# Patient Record
Sex: FEMALE | Race: BLACK OR AFRICAN AMERICAN | NOT HISPANIC OR LATINO | URBAN - METROPOLITAN AREA
[De-identification: names, ages, dates, MRNs, and addresses within clinical notes are randomized per-mention and may not be internally consistent; named-entity substitution may affect disease eponyms.]

---

## 2017-06-29 ENCOUNTER — INPATIENT (INPATIENT)
Facility: HOSPITAL | Age: 62
LOS: 2 days | Discharge: HOME | End: 2017-07-02
Attending: INTERNAL MEDICINE | Admitting: INTERNAL MEDICINE
Payer: COMMERCIAL

## 2017-06-29 VITALS
DIASTOLIC BLOOD PRESSURE: 84 MMHG | HEART RATE: 99 BPM | TEMPERATURE: 99 F | RESPIRATION RATE: 16 BRPM | OXYGEN SATURATION: 97 % | WEIGHT: 231.93 LBS | HEIGHT: 63 IN | SYSTOLIC BLOOD PRESSURE: 164 MMHG

## 2017-06-29 DIAGNOSIS — E11.621 TYPE 2 DIABETES MELLITUS WITH FOOT ULCER: ICD-10-CM

## 2017-06-29 DIAGNOSIS — I10 ESSENTIAL (PRIMARY) HYPERTENSION: ICD-10-CM

## 2017-06-29 DIAGNOSIS — L03.115 CELLULITIS OF RIGHT LOWER LIMB: ICD-10-CM

## 2017-06-29 LAB
ALBUMIN SERPL ELPH-MCNC: 3.1 G/DL — LOW (ref 3.3–5)
ALP SERPL-CCNC: 74 U/L — SIGNIFICANT CHANGE UP (ref 40–120)
ALT FLD-CCNC: 15 U/L — SIGNIFICANT CHANGE UP (ref 12–78)
ANION GAP SERPL CALC-SCNC: 9 MMOL/L — SIGNIFICANT CHANGE UP (ref 5–17)
APPEARANCE UR: CLEAR — SIGNIFICANT CHANGE UP
AST SERPL-CCNC: 11 U/L — LOW (ref 15–37)
BACTERIA # UR AUTO: ABNORMAL
BASOPHILS # BLD AUTO: 0.2 K/UL — SIGNIFICANT CHANGE UP (ref 0–0.2)
BASOPHILS NFR BLD AUTO: 2 % — SIGNIFICANT CHANGE UP (ref 0–2)
BILIRUB SERPL-MCNC: 0.6 MG/DL — SIGNIFICANT CHANGE UP (ref 0.2–1.2)
BILIRUB UR-MCNC: NEGATIVE — SIGNIFICANT CHANGE UP
BUN SERPL-MCNC: 14 MG/DL — SIGNIFICANT CHANGE UP (ref 7–23)
CALCIUM SERPL-MCNC: 8.8 MG/DL — SIGNIFICANT CHANGE UP (ref 8.5–10.1)
CHLORIDE SERPL-SCNC: 104 MMOL/L — SIGNIFICANT CHANGE UP (ref 96–108)
CO2 SERPL-SCNC: 24 MMOL/L — SIGNIFICANT CHANGE UP (ref 22–31)
COLOR SPEC: YELLOW — SIGNIFICANT CHANGE UP
CREAT SERPL-MCNC: 0.78 MG/DL — SIGNIFICANT CHANGE UP (ref 0.5–1.3)
DIFF PNL FLD: NEGATIVE — SIGNIFICANT CHANGE UP
EOSINOPHIL # BLD AUTO: 0 K/UL — SIGNIFICANT CHANGE UP (ref 0–0.5)
EOSINOPHIL NFR BLD AUTO: 0.4 % — SIGNIFICANT CHANGE UP (ref 0–6)
GLUCOSE SERPL-MCNC: 192 MG/DL — HIGH (ref 70–99)
GLUCOSE UR QL: 100 MG/DL
GRAN CASTS # UR COMP ASSIST: SIGNIFICANT CHANGE UP /LPF
HCT VFR BLD CALC: 32.1 % — LOW (ref 34.5–45)
HGB BLD-MCNC: 11 G/DL — LOW (ref 11.5–15.5)
HYALINE CASTS # UR AUTO: ABNORMAL /LPF
KETONES UR-MCNC: NEGATIVE — SIGNIFICANT CHANGE UP
LACTATE SERPL-SCNC: 1.6 MMOL/L — SIGNIFICANT CHANGE UP (ref 0.7–2)
LEUKOCYTE ESTERASE UR-ACNC: NEGATIVE — SIGNIFICANT CHANGE UP
LIDOCAIN IGE QN: 72 U/L — LOW (ref 73–393)
LYMPHOCYTES # BLD AUTO: 2.3 K/UL — SIGNIFICANT CHANGE UP (ref 1–3.3)
LYMPHOCYTES # BLD AUTO: 23.7 % — SIGNIFICANT CHANGE UP (ref 13–44)
MCHC RBC-ENTMCNC: 28.5 PG — SIGNIFICANT CHANGE UP (ref 27–34)
MCHC RBC-ENTMCNC: 34.3 GM/DL — SIGNIFICANT CHANGE UP (ref 32–36)
MCV RBC AUTO: 83 FL — SIGNIFICANT CHANGE UP (ref 80–100)
MONOCYTES # BLD AUTO: 0.6 K/UL — SIGNIFICANT CHANGE UP (ref 0–0.9)
MONOCYTES NFR BLD AUTO: 6.1 % — SIGNIFICANT CHANGE UP (ref 2–14)
NEUTROPHILS # BLD AUTO: 6.7 K/UL — SIGNIFICANT CHANGE UP (ref 1.8–7.4)
NEUTROPHILS NFR BLD AUTO: 67.7 % — SIGNIFICANT CHANGE UP (ref 43–77)
NITRITE UR-MCNC: NEGATIVE — SIGNIFICANT CHANGE UP
PH UR: 6 — SIGNIFICANT CHANGE UP (ref 5–8)
PLATELET # BLD AUTO: 274 K/UL — SIGNIFICANT CHANGE UP (ref 150–400)
POTASSIUM SERPL-MCNC: 4.2 MMOL/L — SIGNIFICANT CHANGE UP (ref 3.5–5.3)
POTASSIUM SERPL-SCNC: 4.2 MMOL/L — SIGNIFICANT CHANGE UP (ref 3.5–5.3)
PROT SERPL-MCNC: 7.2 GM/DL — SIGNIFICANT CHANGE UP (ref 6–8.3)
PROT UR-MCNC: 15 MG/DL
RBC # BLD: 3.86 M/UL — SIGNIFICANT CHANGE UP (ref 3.8–5.2)
RBC # FLD: 12.8 % — SIGNIFICANT CHANGE UP (ref 11–15)
SODIUM SERPL-SCNC: 137 MMOL/L — SIGNIFICANT CHANGE UP (ref 135–145)
SP GR SPEC: 1.01 — SIGNIFICANT CHANGE UP (ref 1.01–1.02)
UROBILINOGEN FLD QL: NEGATIVE MG/DL — SIGNIFICANT CHANGE UP
WBC # BLD: 9.9 K/UL — SIGNIFICANT CHANGE UP (ref 3.8–10.5)
WBC # FLD AUTO: 9.9 K/UL — SIGNIFICANT CHANGE UP (ref 3.8–10.5)

## 2017-06-29 PROCEDURE — 99285 EMERGENCY DEPT VISIT HI MDM: CPT

## 2017-06-29 PROCEDURE — 93971 EXTREMITY STUDY: CPT | Mod: 26,RT

## 2017-06-29 PROCEDURE — 73630 X-RAY EXAM OF FOOT: CPT | Mod: 26,RT

## 2017-06-29 RX ORDER — PANTOPRAZOLE SODIUM 20 MG/1
40 TABLET, DELAYED RELEASE ORAL
Qty: 0 | Refills: 0 | Status: DISCONTINUED | OUTPATIENT
Start: 2017-06-29 | End: 2017-07-02

## 2017-06-29 RX ORDER — CEFTRIAXONE 500 MG/1
1 INJECTION, POWDER, FOR SOLUTION INTRAMUSCULAR; INTRAVENOUS ONCE
Qty: 0 | Refills: 0 | Status: COMPLETED | OUTPATIENT
Start: 2017-06-29 | End: 2017-06-29

## 2017-06-29 RX ORDER — ACETAMINOPHEN 500 MG
650 TABLET ORAL ONCE
Qty: 0 | Refills: 0 | Status: COMPLETED | OUTPATIENT
Start: 2017-06-29 | End: 2017-06-29

## 2017-06-29 RX ORDER — PIPERACILLIN AND TAZOBACTAM 4; .5 G/20ML; G/20ML
3.38 INJECTION, POWDER, LYOPHILIZED, FOR SOLUTION INTRAVENOUS EVERY 8 HOURS
Qty: 0 | Refills: 0 | Status: DISCONTINUED | OUTPATIENT
Start: 2017-06-29 | End: 2017-07-02

## 2017-06-29 RX ORDER — DEXTROSE 50 % IN WATER 50 %
25 SYRINGE (ML) INTRAVENOUS ONCE
Qty: 0 | Refills: 0 | Status: DISCONTINUED | OUTPATIENT
Start: 2017-06-29 | End: 2017-07-02

## 2017-06-29 RX ORDER — PIPERACILLIN AND TAZOBACTAM 4; .5 G/20ML; G/20ML
3.38 INJECTION, POWDER, LYOPHILIZED, FOR SOLUTION INTRAVENOUS ONCE
Qty: 0 | Refills: 0 | Status: COMPLETED | OUTPATIENT
Start: 2017-06-29 | End: 2017-06-29

## 2017-06-29 RX ORDER — DEXTROSE 50 % IN WATER 50 %
12.5 SYRINGE (ML) INTRAVENOUS ONCE
Qty: 0 | Refills: 0 | Status: DISCONTINUED | OUTPATIENT
Start: 2017-06-29 | End: 2017-07-02

## 2017-06-29 RX ORDER — SODIUM CHLORIDE 9 MG/ML
1000 INJECTION, SOLUTION INTRAVENOUS
Qty: 0 | Refills: 0 | Status: DISCONTINUED | OUTPATIENT
Start: 2017-06-29 | End: 2017-07-02

## 2017-06-29 RX ORDER — HEPARIN SODIUM 5000 [USP'U]/ML
5000 INJECTION INTRAVENOUS; SUBCUTANEOUS EVERY 12 HOURS
Qty: 0 | Refills: 0 | Status: DISCONTINUED | OUTPATIENT
Start: 2017-06-29 | End: 2017-07-02

## 2017-06-29 RX ORDER — DEXTROSE 50 % IN WATER 50 %
1 SYRINGE (ML) INTRAVENOUS ONCE
Qty: 0 | Refills: 0 | Status: DISCONTINUED | OUTPATIENT
Start: 2017-06-29 | End: 2017-07-02

## 2017-06-29 RX ORDER — SODIUM CHLORIDE 9 MG/ML
1000 INJECTION, SOLUTION INTRAVENOUS
Qty: 0 | Refills: 0 | Status: DISCONTINUED | OUTPATIENT
Start: 2017-06-29 | End: 2017-06-30

## 2017-06-29 RX ORDER — GLUCAGON INJECTION, SOLUTION 0.5 MG/.1ML
1 INJECTION, SOLUTION SUBCUTANEOUS ONCE
Qty: 0 | Refills: 0 | Status: DISCONTINUED | OUTPATIENT
Start: 2017-06-29 | End: 2017-07-02

## 2017-06-29 RX ORDER — INSULIN LISPRO 100/ML
VIAL (ML) SUBCUTANEOUS
Qty: 0 | Refills: 0 | Status: DISCONTINUED | OUTPATIENT
Start: 2017-06-29 | End: 2017-07-02

## 2017-06-29 RX ORDER — DOCUSATE SODIUM 100 MG
100 CAPSULE ORAL THREE TIMES A DAY
Qty: 0 | Refills: 0 | Status: DISCONTINUED | OUTPATIENT
Start: 2017-06-29 | End: 2017-07-02

## 2017-06-29 RX ADMIN — Medication 100 MILLIGRAM(S): at 22:02

## 2017-06-29 RX ADMIN — Medication 650 MILLIGRAM(S): at 14:19

## 2017-06-29 RX ADMIN — CEFTRIAXONE 100 GRAM(S): 500 INJECTION, POWDER, FOR SOLUTION INTRAMUSCULAR; INTRAVENOUS at 17:58

## 2017-06-29 RX ADMIN — PIPERACILLIN AND TAZOBACTAM 200 GRAM(S): 4; .5 INJECTION, POWDER, LYOPHILIZED, FOR SOLUTION INTRAVENOUS at 19:55

## 2017-06-29 RX ADMIN — PIPERACILLIN AND TAZOBACTAM 25 GRAM(S): 4; .5 INJECTION, POWDER, LYOPHILIZED, FOR SOLUTION INTRAVENOUS at 22:10

## 2017-06-29 RX ADMIN — Medication 650 MILLIGRAM(S): at 14:59

## 2017-06-29 RX ADMIN — HEPARIN SODIUM 5000 UNIT(S): 5000 INJECTION INTRAVENOUS; SUBCUTANEOUS at 19:55

## 2017-06-29 RX ADMIN — SODIUM CHLORIDE 70 MILLILITER(S): 9 INJECTION, SOLUTION INTRAVENOUS at 21:59

## 2017-06-29 NOTE — H&P ADULT - NSHPLABSRESULTS_GEN_ALL_CORE
LABS:                        11.0   9.9   )-----------( 274      ( 29 Jun 2017 16:45 )             32.1     06-29    137  |  104  |  14  ----------------------------<  192<H>  4.2   |  24  |  0.78    Ca    8.8      29 Jun 2017 16:45    TPro  7.2  /  Alb  3.1<L>  /  TBili  0.6  /  DBili  x   /  AST  11<L>  /  ALT  15  /  AlkPhos  74  06-29      < from: Xray Foot AP + Lateral + Oblique, Right (06.29.17 @ 15:29) >      EXAM:  FOOT COMPLETE  3 VWS  RT                            PROCEDURE DATE:  06/29/2017        INTERPRETATION:  CLINICAL INFORMATION: Right lower extremity ulcer. Pain   and swelling for one day.    EXAM: Frontal, lateral, and oblique views of the right foot on 6/29/2017.    FINDINGS:    No radiographic evidence of large soft tissue ulceration. Mild soft   tissue swelling about the right foot. No aggressive periosteal reaction   or cortical destruction. No subcutaneous emphysema. No acute fracture or   dislocation is identified. The lisfranc alignment is intact.     IMPRESSION:    Mild soft tissue swelling about the right foot. No evidence of   subcutaneous gas, cortical destruction, or aggressive periosteal reaction.          < from: US Duplex Venous Lower Ext Ltd, Right (06.29.17 @ 15:54) >      EXAM:  US DPLX LWR EXT VEINS LTD RT                            PROCEDURE DATE:  06/29/2017        INTERPRETATION:  CLINICAL INFORMATION: Right lower extremity pain and   swelling. Rule out DVT.    COMPARISON: Right lower extremity duplex of 9/19/2016.    TECHNIQUE: Duplex sonography of the right lower extremity with color and   spectral Doppler, with and without compression.      FINDINGS:    There is normal compressibility of the right common femoral, femoral and   popliteal veins. No calf vein thrombosis is detected.    Doppler examination shows normal spontaneous and phasic flow.    IMPRESSION:     No evidence of right lower extremity deep venous thrombosis.      < end of copied text >

## 2017-06-29 NOTE — H&P ADULT - HISTORY OF PRESENT ILLNESS
61yo, BF with history of DM2, PVD, phlebitis, HTN, ANIA who presents with a chief complaint of RLE redness and swelling, started yesterday but has had a history of recurrent RLE cellulitis for 17yrs. Pt was seen by Dr. Ramirez today and was instructed to go to the hospital for admission and IV abx.   Patient notes increasing pain, swelling, associated with ulcer R foot.  Denies CP, SOB, cough, palpitation, n/v/d, fever, chills, weakness, abdominal pain ,dysuria.

## 2017-06-29 NOTE — ED ADULT NURSE NOTE - PMH
Cellulitis of leg without foot, right    Diabetes    Diabetes    Hypertension    Hypertension    ANIA (obstructive sleep apnea)    Phlebitis

## 2017-06-29 NOTE — CONSULT NOTE ADULT - ASSESSMENT
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 Guerin

## 2017-06-29 NOTE — CONSULT NOTE ADULT - SUBJECTIVE AND OBJECTIVE BOX
Patient is a 62y old  Female with history of DM, phlebitis, HTN, ANIA who presents with a chief complaint of RLE redness swelling and pain. Pt reports the symptoms started yesterday but has had a history of recurrent RLE cellulitis from time to time. Pt was seen by Dr. Ramirez today and was instructed to go to the hospital for admission and IV abx. Pt reports sometimes her groin swells and becomes painful as well. Pt denies f/c/n/v/SOB/CP. Admits to traveling to [place] on a plane a month ago.      PAST MEDICAL & SURGICAL HISTORY:  ANIA (obstructive sleep apnea)  Cellulitis of leg without foot, right  Phlebitis  Hypertension  Diabetes  Hypertension  Diabetes  No significant past surgical history      MEDICATIONS  (STANDING):    MEDICATIONS  (PRN):      Allergies    No Known Allergies    Intolerances        VITALS:    Vital Signs Last 24 Hrs  T(C): 37.3 (29 Jun 2017 12:52), Max: 37.3 (29 Jun 2017 12:52)  T(F): 99.1 (29 Jun 2017 12:52), Max: 99.1 (29 Jun 2017 12:52)  HR: 99 (29 Jun 2017 12:52) (99 - 99)  BP: 164/84 (29 Jun 2017 12:52) (164/84 - 164/84)  BP(mean): --  RR: 16 (29 Jun 2017 12:52) (16 - 16)  SpO2: 97% (29 Jun 2017 12:52) (97% - 97%)    LABS:                CAPILLARY BLOOD GLUCOSE              LOWER EXTREMITY PHYSICAL EXAM:    Vasular: DP/PT 2/4, B/L, CFT <2 seconds B/L, Temperature gradient WNL, L, increased on R.   Neuro: Epicritic sensation intact  Wound #1:   Location: right 4th interspace  Size: 1cm x 1cm x 0cm  Wound bed: Maceration  Drainage: None  Odor: None  Periwound: Macerated  Etiology: Moisture    RADIOLOGY & ADDITIONAL STUDIES:

## 2017-06-29 NOTE — H&P ADULT - PROBLEM SELECTOR PROBLEM 2
Diabetic ulcer of toe of right foot associated with type 2 diabetes mellitus, unspecified ulcer stage

## 2017-06-29 NOTE — ED PROVIDER NOTE - SKIN COLOR
RLE has 6x 5 cm erythematous, warm and tender region on anterior lower shin. No calf pain, but has some swelling.

## 2017-06-29 NOTE — ED PROVIDER NOTE - OBJECTIVE STATEMENT
Pt is a 63 yo lady with a pmhx of NIDM2 who presents to the ED with R. leg pain and redness. Has had pain since last night, has had prior cellulitis mult times in past. Has an ulcer between 4th toe. No fevers. No chest pain, no sob, no hx of dvt/pe. No dysuria, no n/v/d. No trauma. Podiatrist sent her in for IV abx and admission.

## 2017-06-29 NOTE — H&P ADULT - NSHPREVIEWOFSYSTEMS_GEN_ALL_CORE
CONSTITUTIONAL: No fever, weight loss, or fatigue  EYES: No eye pain, visual disturbances, or discharge  ENMT:  No difficulty hearing, tinnitus, vertigo; No sinus or throat pain  NECK: No pain or stiffness  BREASTS: No pain, masses, or nipple discharge  RESPIRATORY: No cough, wheezing, chills or hemoptysis; No shortness of breath  CARDIOVASCULAR: No chest pain, palpitations, dizziness, or leg swelling  GASTROINTESTINAL: No abdominal or epigastric pain. No nausea, vomiting, or hematemesis; No diarrhea or constipation. No melena or hematochezia.  GENITOURINARY: No dysuria, frequency, hematuria, or incontinence  NEUROLOGICAL: No headaches, memory loss, loss of strength, numbness, or tremors  SKIN: +++ redness, R leg, ulcer R 4th digit.  LYMPH NODES: No enlarged glands  ENDOCRINE: No heat or cold intolerance; No hair loss  MUSCULOSKELETAL: Positive swelling and  pain R leg and foot.  PSYCHIATRIC: No depression, anxiety, mood swings, or difficulty sleeping  HEME/LYMPH: No easy bruising, or bleeding gums  ALLERGY AND IMMUNOLOGIC: No hives or eczema

## 2017-06-29 NOTE — H&P ADULT - NSHPPHYSICALEXAM_GEN_ALL_CORE
PHYSICAL EXAM:  GENERAL: NAD, well-groomed, well-developed  HEAD:  Atraumatic, Normocephalic  EYES: EOMI, PERRLA, conjunctiva and sclera clear  ENMT: No tonsillar erythema, exudates, or enlargement; Moist mucous membranes, No lesions  NECK: Supple, No JVD, Normal thyroid  NERVOUS SYSTEM:  Alert & Oriented X3; Motor Strength 5/5 B/L upper and lower extremities; DTRs 2+ intact and symmetric  CHEST/LUNG: Clear bilaterally; No rales, rhonchi, wheezing, or rubs  HEART: Regular rate and rhythm; No murmurs, rubs, or gallops  ABDOMEN: Soft, obese, Nontender, Nondistended; Bowel sounds present  EXTREMITIES:  1+ pedal/ RLE edema with tenderness  LYMPH: No lymphadenopathy noted  SKIN: Erythema anterior RLE, ulceration 4th digit.

## 2017-06-30 DIAGNOSIS — L03.115 CELLULITIS OF RIGHT LOWER LIMB: ICD-10-CM

## 2017-06-30 LAB
ALBUMIN SERPL ELPH-MCNC: 2.9 G/DL — LOW (ref 3.3–5)
ALP SERPL-CCNC: 68 U/L — SIGNIFICANT CHANGE UP (ref 40–120)
ALT FLD-CCNC: 15 U/L — SIGNIFICANT CHANGE UP (ref 12–78)
ANION GAP SERPL CALC-SCNC: 9 MMOL/L — SIGNIFICANT CHANGE UP (ref 5–17)
AST SERPL-CCNC: 11 U/L — LOW (ref 15–37)
BASOPHILS # BLD AUTO: 0.1 K/UL — SIGNIFICANT CHANGE UP (ref 0–0.2)
BASOPHILS NFR BLD AUTO: 1.5 % — SIGNIFICANT CHANGE UP (ref 0–2)
BILIRUB SERPL-MCNC: 0.7 MG/DL — SIGNIFICANT CHANGE UP (ref 0.2–1.2)
BUN SERPL-MCNC: 15 MG/DL — SIGNIFICANT CHANGE UP (ref 7–23)
CALCIUM SERPL-MCNC: 8.7 MG/DL — SIGNIFICANT CHANGE UP (ref 8.5–10.1)
CHLORIDE SERPL-SCNC: 104 MMOL/L — SIGNIFICANT CHANGE UP (ref 96–108)
CO2 SERPL-SCNC: 26 MMOL/L — SIGNIFICANT CHANGE UP (ref 22–31)
CREAT SERPL-MCNC: 0.84 MG/DL — SIGNIFICANT CHANGE UP (ref 0.5–1.3)
EOSINOPHIL # BLD AUTO: 0.1 K/UL — SIGNIFICANT CHANGE UP (ref 0–0.5)
EOSINOPHIL NFR BLD AUTO: 1.2 % — SIGNIFICANT CHANGE UP (ref 0–6)
GLUCOSE SERPL-MCNC: 187 MG/DL — HIGH (ref 70–99)
HBA1C BLD-MCNC: 9.5 % — HIGH (ref 4–5.6)
HCT VFR BLD CALC: 32.1 % — LOW (ref 34.5–45)
HGB BLD-MCNC: 11 G/DL — LOW (ref 11.5–15.5)
LYMPHOCYTES # BLD AUTO: 2.6 K/UL — SIGNIFICANT CHANGE UP (ref 1–3.3)
LYMPHOCYTES # BLD AUTO: 30.4 % — SIGNIFICANT CHANGE UP (ref 13–44)
MCHC RBC-ENTMCNC: 28.5 PG — SIGNIFICANT CHANGE UP (ref 27–34)
MCHC RBC-ENTMCNC: 34.2 GM/DL — SIGNIFICANT CHANGE UP (ref 32–36)
MCV RBC AUTO: 83.2 FL — SIGNIFICANT CHANGE UP (ref 80–100)
MONOCYTES # BLD AUTO: 0.6 K/UL — SIGNIFICANT CHANGE UP (ref 0–0.9)
MONOCYTES NFR BLD AUTO: 7.3 % — SIGNIFICANT CHANGE UP (ref 2–14)
NEUTROPHILS # BLD AUTO: 5.2 K/UL — SIGNIFICANT CHANGE UP (ref 1.8–7.4)
NEUTROPHILS NFR BLD AUTO: 59.5 % — SIGNIFICANT CHANGE UP (ref 43–77)
PLATELET # BLD AUTO: 246 K/UL — SIGNIFICANT CHANGE UP (ref 150–400)
POTASSIUM SERPL-MCNC: 4.2 MMOL/L — SIGNIFICANT CHANGE UP (ref 3.5–5.3)
POTASSIUM SERPL-SCNC: 4.2 MMOL/L — SIGNIFICANT CHANGE UP (ref 3.5–5.3)
PROT SERPL-MCNC: 6.9 GM/DL — SIGNIFICANT CHANGE UP (ref 6–8.3)
RBC # BLD: 3.86 M/UL — SIGNIFICANT CHANGE UP (ref 3.8–5.2)
RBC # FLD: 12.5 % — SIGNIFICANT CHANGE UP (ref 11–15)
SODIUM SERPL-SCNC: 139 MMOL/L — SIGNIFICANT CHANGE UP (ref 135–145)
WBC # BLD: 8.7 K/UL — SIGNIFICANT CHANGE UP (ref 3.8–10.5)
WBC # FLD AUTO: 8.7 K/UL — SIGNIFICANT CHANGE UP (ref 3.8–10.5)

## 2017-06-30 RX ORDER — FUROSEMIDE 40 MG
40 TABLET ORAL ONCE
Qty: 0 | Refills: 0 | Status: COMPLETED | OUTPATIENT
Start: 2017-06-30 | End: 2017-06-30

## 2017-06-30 RX ADMIN — Medication 40 MILLIGRAM(S): at 13:24

## 2017-06-30 RX ADMIN — HEPARIN SODIUM 5000 UNIT(S): 5000 INJECTION INTRAVENOUS; SUBCUTANEOUS at 18:47

## 2017-06-30 RX ADMIN — Medication 1 TABLET(S): at 11:56

## 2017-06-30 RX ADMIN — HEPARIN SODIUM 5000 UNIT(S): 5000 INJECTION INTRAVENOUS; SUBCUTANEOUS at 06:09

## 2017-06-30 RX ADMIN — Medication 2: at 16:23

## 2017-06-30 RX ADMIN — PIPERACILLIN AND TAZOBACTAM 25 GRAM(S): 4; .5 INJECTION, POWDER, LYOPHILIZED, FOR SOLUTION INTRAVENOUS at 14:41

## 2017-06-30 RX ADMIN — PIPERACILLIN AND TAZOBACTAM 25 GRAM(S): 4; .5 INJECTION, POWDER, LYOPHILIZED, FOR SOLUTION INTRAVENOUS at 23:02

## 2017-06-30 RX ADMIN — Medication 6: at 11:56

## 2017-06-30 RX ADMIN — PIPERACILLIN AND TAZOBACTAM 25 GRAM(S): 4; .5 INJECTION, POWDER, LYOPHILIZED, FOR SOLUTION INTRAVENOUS at 06:08

## 2017-06-30 RX ADMIN — Medication 100 MILLIGRAM(S): at 13:25

## 2017-06-30 RX ADMIN — Medication 2: at 08:10

## 2017-06-30 RX ADMIN — PANTOPRAZOLE SODIUM 40 MILLIGRAM(S): 20 TABLET, DELAYED RELEASE ORAL at 08:10

## 2017-06-30 NOTE — PROGRESS NOTE ADULT - PROBLEM SELECTOR PLAN 2
As above  Podiatry consult noted: cellulitis of leg  no podiatric emergent finding  c/w betadine to 4th interspace for maceration of right foot  reconsult podiatry as needed  c/w IV abx for leg cellulitis.   Local wound care  Insulin sliding scale and FS

## 2017-06-30 NOTE — DIETITIAN INITIAL EVALUATION ADULT. - PERTINENT LABORATORY DATA
06-30 Na 139 mmol/L Glu 187 mg/dL<H> K+ 4.2 mmol/L Cr  0.84 mg/dL BUN 15 mg/dL Phos n/a   Alb 2.9 g/dL<L> PAB n/a   Hgb 11.0 g/dL<L> Hct 32.1 %<L>, Lipase=72, VdnM6G=4.5%(6/30)

## 2017-06-30 NOTE — DIETITIAN INITIAL EVALUATION ADULT. - OTHER INFO
Pt seen for RN consult 6/30 for BMI?40 & A1C=9.5%. Pt lives with mother & grand daughter & pt does cooking & food shopping. Pt manages DM type 2 with Metformin 500mg 2 x day & Glimepiride 4mg daily & checks Blood glucose 1-2 x day. Pt reports last BM x 1 (6/29). Po intake currently 100% meals today.

## 2017-06-30 NOTE — DIETITIAN INITIAL EVALUATION ADULT. - NUTRITION INTERVENTION
Nutrition Education Meals and Snack/Collaboration and Referral of Nutrition Care/Nutrition Education

## 2017-07-01 LAB
ANION GAP SERPL CALC-SCNC: 10 MMOL/L — SIGNIFICANT CHANGE UP (ref 5–17)
BUN SERPL-MCNC: 18 MG/DL — SIGNIFICANT CHANGE UP (ref 7–23)
CALCIUM SERPL-MCNC: 9 MG/DL — SIGNIFICANT CHANGE UP (ref 8.5–10.1)
CHLORIDE SERPL-SCNC: 100 MMOL/L — SIGNIFICANT CHANGE UP (ref 96–108)
CO2 SERPL-SCNC: 28 MMOL/L — SIGNIFICANT CHANGE UP (ref 22–31)
CREAT SERPL-MCNC: 0.99 MG/DL — SIGNIFICANT CHANGE UP (ref 0.5–1.3)
CULTURE RESULTS: NO GROWTH — SIGNIFICANT CHANGE UP
GLUCOSE SERPL-MCNC: 206 MG/DL — HIGH (ref 70–99)
HCT VFR BLD CALC: 36.4 % — SIGNIFICANT CHANGE UP (ref 34.5–45)
HGB BLD-MCNC: 12.2 G/DL — SIGNIFICANT CHANGE UP (ref 11.5–15.5)
MCHC RBC-ENTMCNC: 27.8 PG — SIGNIFICANT CHANGE UP (ref 27–34)
MCHC RBC-ENTMCNC: 33.6 GM/DL — SIGNIFICANT CHANGE UP (ref 32–36)
MCV RBC AUTO: 82.8 FL — SIGNIFICANT CHANGE UP (ref 80–100)
PLATELET # BLD AUTO: 276 K/UL — SIGNIFICANT CHANGE UP (ref 150–400)
POTASSIUM SERPL-MCNC: 4 MMOL/L — SIGNIFICANT CHANGE UP (ref 3.5–5.3)
POTASSIUM SERPL-SCNC: 4 MMOL/L — SIGNIFICANT CHANGE UP (ref 3.5–5.3)
RBC # BLD: 4.4 M/UL — SIGNIFICANT CHANGE UP (ref 3.8–5.2)
RBC # FLD: 12.5 % — SIGNIFICANT CHANGE UP (ref 11–15)
SODIUM SERPL-SCNC: 138 MMOL/L — SIGNIFICANT CHANGE UP (ref 135–145)
SPECIMEN SOURCE: SIGNIFICANT CHANGE UP
WBC # BLD: 7.3 K/UL — SIGNIFICANT CHANGE UP (ref 3.8–10.5)
WBC # FLD AUTO: 7.3 K/UL — SIGNIFICANT CHANGE UP (ref 3.8–10.5)

## 2017-07-01 RX ADMIN — PIPERACILLIN AND TAZOBACTAM 25 GRAM(S): 4; .5 INJECTION, POWDER, LYOPHILIZED, FOR SOLUTION INTRAVENOUS at 05:09

## 2017-07-01 RX ADMIN — PIPERACILLIN AND TAZOBACTAM 25 GRAM(S): 4; .5 INJECTION, POWDER, LYOPHILIZED, FOR SOLUTION INTRAVENOUS at 22:21

## 2017-07-01 RX ADMIN — Medication 2: at 08:28

## 2017-07-01 RX ADMIN — Medication 2: at 15:55

## 2017-07-01 RX ADMIN — PIPERACILLIN AND TAZOBACTAM 25 GRAM(S): 4; .5 INJECTION, POWDER, LYOPHILIZED, FOR SOLUTION INTRAVENOUS at 13:43

## 2017-07-01 RX ADMIN — Medication 1 TABLET(S): at 11:39

## 2017-07-01 RX ADMIN — Medication 10: at 11:39

## 2017-07-01 RX ADMIN — Medication 100 MILLIGRAM(S): at 13:46

## 2017-07-01 RX ADMIN — PANTOPRAZOLE SODIUM 40 MILLIGRAM(S): 20 TABLET, DELAYED RELEASE ORAL at 08:28

## 2017-07-01 RX ADMIN — HEPARIN SODIUM 5000 UNIT(S): 5000 INJECTION INTRAVENOUS; SUBCUTANEOUS at 05:09

## 2017-07-01 RX ADMIN — HEPARIN SODIUM 5000 UNIT(S): 5000 INJECTION INTRAVENOUS; SUBCUTANEOUS at 18:05

## 2017-07-01 NOTE — PROGRESS NOTE ADULT - PROBLEM SELECTOR PLAN 1
IV Zosyn  IVF
IV Zosyn  IVF
no foot wound.  cellulitis of leg  no podiatric emergent finding  c/w betadine to 4th interspace for maceration of right foot  reconsult podiatry as needed  c/w IV abx for leg cellulitis

## 2017-07-01 NOTE — PROGRESS NOTE ADULT - ASSESSMENT
Admitted for Cellulitis RLE with ulceration 4th toe, DM2 and HTN.
Admitted for Cellulitis RLE with ulceration 4th toe, DM2 and HTN.
62F wit with RLE cellulitis

## 2017-07-01 NOTE — PROGRESS NOTE ADULT - PROBLEM SELECTOR PROBLEM 1
Cellulitis of right lower extremity
Cellulitis of right lower extremity
Cellulitis of leg without foot, right

## 2017-07-01 NOTE — PROGRESS NOTE ADULT - SUBJECTIVE AND OBJECTIVE BOX
Patient is a 62y old  Female who presents with a chief complaint of foot pain and swelling (2017 22:30)      INTERVAL HPI/OVERNIGHT EVENTS: NAD, AAOX3. no pain in foot    MEDICATIONS  (STANDING):  heparin  Injectable 5000 Unit(s) SubCutaneous every 12 hours  sodium chloride 0.45%. 1000 milliLiter(s) (70 mL/Hr) IV Continuous <Continuous>  docusate sodium 100 milliGRAM(s) Oral three times a day  multivitamin 1 Tablet(s) Oral daily  insulin lispro (HumaLOG) corrective regimen sliding scale   SubCutaneous three times a day before meals  dextrose 5%. 1000 milliLiter(s) (50 mL/Hr) IV Continuous <Continuous>  dextrose 50% Injectable 12.5 Gram(s) IV Push once  dextrose 50% Injectable 25 Gram(s) IV Push once  dextrose 50% Injectable 25 Gram(s) IV Push once  piperacillin/tazobactam IVPB. 3.375 Gram(s) IV Intermittent every 8 hours  pantoprazole    Tablet 40 milliGRAM(s) Oral before breakfast    MEDICATIONS  (PRN):  dextrose Gel 1 Dose(s) Oral once PRN Blood Glucose LESS THAN 70 milliGRAM(s)/deciliter  glucagon  Injectable 1 milliGRAM(s) IntraMuscular once PRN Glucose LESS THAN 70 milligrams/deciliter  oxyCODONE  5 mG/acetaminophen 325 mG 2 Tablet(s) Oral every 4 hours PRN Moderate Pain (4 - 6)      Allergies    No Known Allergies    Intolerances        REVIEW OF SYSTEMS:  CONSTITUTIONAL: No fever, weight loss, or fatigue  EYES: No eye pain, visual disturbances, or discharge  ENMT:  No difficulty hearing, tinnitus, vertigo; No sinus or throat pain  NECK: No pain or stiffness  BREASTS: No pain, masses, or nipple discharge  RESPIRATORY: No cough, wheezing, chills or hemoptysis; No shortness of breath  CARDIOVASCULAR: No chest pain, palpitations, dizziness, or leg swelling  GASTROINTESTINAL: No abdominal or epigastric pain. No nausea, vomiting, or hematemesis; No diarrhea or constipation. No melena or hematochezia.  GENITOURINARY: No dysuria, frequency, hematuria, or incontinence  NEUROLOGICAL: No headaches, memory loss, loss of strength, numbness, or tremors  SKIN: No itching, burning, rashes, or lesions   LYMPH NODES: No enlarged glands  ENDOCRINE: No heat or cold intolerance; No hair loss  MUSCULOSKELETAL: No joint pain or swelling; No muscle, back, or extremity pain  PSYCHIATRIC: No depression, anxiety, mood swings, or difficulty sleeping  HEME/LYMPH: No easy bruising, or bleeding gums  ALLERY AND IMMUNOLOGIC: No hives or eczema    Vital Signs Last 24 Hrs  T(C): 36.8 (2017 05:32), Max: 37.3 (2017 12:52)  T(F): 98.3 (2017 05:32), Max: 99.1 (2017 12:52)  HR: 66 (2017 05:32) (62 - 99)  BP: 143/75 (2017 05:32) (143/75 - 174/81)  BP(mean): --  RR: 16 (2017 05:32) (16 - 17)  SpO2: 96% (2017 05:32) (96% - 98%)    Vasular: DP/PT 2/4, B/L, CFT <2 seconds B/L, Temperature gradient WNL, L, increased on R.   Neuro: Epicritic sensation intact  Wound #1:   Location: right 4th interspace  Size: 1cm x 1cm x 0cm  Wound bed: Maceration  Drainage: None  Odor: None  Periwound: Macerated    I&O's Detail    2017 07:01  -  2017 07:00  --------------------------------------------------------  IN:    IV PiggyBack: 200 mL    sodium chloride 0.45%.: 630 mL  Total IN: 830 mL    OUT:  Total OUT: 0 mL    Total NET: 830 mL          LABS:                        11.0   8.7   )-----------( 246      ( 2017 06:49 )             32.1         139  |  104  |  15  ----------------------------<  187<H>  4.2   |  26  |  0.84    Ca    8.7      2017 06:49    TPro  6.9  /  Alb  2.9<L>  /  TBili  0.7  /  DBili  x   /  AST  11<L>  /  ALT  15  /  AlkPhos  68  06-30      Urinalysis Basic - ( 2017 18:37 )    Color: Yellow / Appearance: Clear / S.010 / pH: x  Gluc: x / Ketone: Negative  / Bili: Negative / Urobili: Negative mg/dL   Blood: x / Protein: 15 mg/dL / Nitrite: Negative   Leuk Esterase: Negative / RBC: x / WBC x   Sq Epi: x / Non Sq Epi: x / Bacteria: Few      CAPILLARY BLOOD GLUCOSE  223 (2017 22:28)  161 (2017 18:01)          RADIOLOGY & ADDITIONAL TESTS:    Imaging Personally Reviewed:  [ ] YES  [ ] NO    Consultant(s) Notes Reviewed:  [ ] YES  [ ] NO    Care Discussed with Consultants/Other Providers [ ] YES  [ ] NO    Essential hypertension  Diabetic ulcer of toe of right foot associated with type 2 diabetes mellitus, unspecified ulcer stage  Cellulitis of right lower extremity
Patient is a 62y old  Female who presents with a chief complaint of foot pain and swelling (2017 22:30)      SUBJECTIVE/OBJECTIVE:    Without complaints.  OOB in chair.      MEDICATIONS  (STANDING):  heparin  Injectable 5000 Unit(s) SubCutaneous every 12 hours  docusate sodium 100 milliGRAM(s) Oral three times a day  multivitamin 1 Tablet(s) Oral daily  insulin lispro (HumaLOG) corrective regimen sliding scale   SubCutaneous three times a day before meals  dextrose 5%. 1000 milliLiter(s) (50 mL/Hr) IV Continuous <Continuous>  dextrose 50% Injectable 12.5 Gram(s) IV Push once  dextrose 50% Injectable 25 Gram(s) IV Push once  dextrose 50% Injectable 25 Gram(s) IV Push once  piperacillin/tazobactam IVPB. 3.375 Gram(s) IV Intermittent every 8 hours  pantoprazole    Tablet 40 milliGRAM(s) Oral before breakfast    MEDICATIONS  (PRN):  dextrose Gel 1 Dose(s) Oral once PRN Blood Glucose LESS THAN 70 milliGRAM(s)/deciliter  glucagon  Injectable 1 milliGRAM(s) IntraMuscular once PRN Glucose LESS THAN 70 milligrams/deciliter  oxyCODONE  5 mG/acetaminophen 325 mG 2 Tablet(s) Oral every 4 hours PRN Moderate Pain (4 - 6)      Allergies    No Known Allergies    Intolerances          Vital Signs Last 24 Hrs  T(C): 36.4 (2017 05:13), Max: 37.1 (2017 17:09)  T(F): 97.6 (2017 05:13), Max: 98.8 (2017 17:09)  HR: 73 (2017 05:13) (73 - 79)  BP: 116/55 (2017 05:13) (116/55 - 139/78)  BP(mean): --  RR: 19 (2017 05:13) (16 - 19)  SpO2: 94% (2017 05:13) (94% - 98%)    Physical Exam:  GENERAL: NAD, well-groomed, well-developed HEAD:  Atraumatic, Normocephalic EYES: EOMI, PERRLA, conjunctiva and sclera clear ENMT: No tonsillar erythema, exudates, or enlargement; Moist mucous membranes, No lesions NECK: Supple, No JVD, Normal thyroid NERVOUS SYSTEM:  Alert & Oriented X3; Motor Strength 5/5 B/L upper and lower extremities; DTRs 2+ intact and symmetric CHEST/LUNG: Clear bilaterally; No rales, rhonchi, wheezing, or rubs HEART: Regular rate and rhythm; No murmurs, rubs, or gallops ABDOMEN: Soft, obese, Nontender, Nondistended; Bowel sounds present EXTREMITIES:  Trace  pedal/ RLE edema with decreased  tenderness LYMPH: No lymphadenopathy noted SKIN: Decreased Erythema anterior RLE, ulceration 4th digit.	    Vasular: DP/PT 2/4, B/L, CFT <2 seconds B/L, Temperature gradient WNL, L, increased on R.   Neuro: Epicritic sensation intact  Wound #1:   Location: right 4th interspace  Size: 1cm x 1cm x 0cm  Wound bed: Maceration  Drainage: None  Odor: None  Periwound: Macerated                LABS:                        12.2   7.3   )-----------( 276      ( 2017 07:39 )             36.4     07-    138  |  100  |  18  ----------------------------<  206<H>  4.0   |  28  |  0.99    Ca    9.0      2017 07:39    TPro  6.9  /  Alb  2.9<L>  /  TBili  0.7  /  DBili  x   /  AST  11<L>  /  ALT  15  /  AlkPhos  68  06-30      Urinalysis Basic - ( 2017 18:37 )    Color: Yellow / Appearance: Clear / S.010 / pH: x  Gluc: x / Ketone: Negative  / Bili: Negative / Urobili: Negative mg/dL   Blood: x / Protein: 15 mg/dL / Nitrite: Negative   Leuk Esterase: Negative / RBC: x / WBC x   Sq Epi: x / Non Sq Epi: x / Bacteria: Few      CAPILLARY BLOOD GLUCOSE  389 (2017 11:38)  197 (2017 08:27)  182 (2017 16:22)              RADIOLOGY & ADDITIONAL TESTS:        Consultant(s) Notes Reviewed:  [ ] YES  [ ] NO
Patient is a 62y old  Female who presents with a chief complaint of foot pain and swelling (2017 22:30)      SUBJECTIVE/OBJECTIVE:  Without complaints, notes less R leg pain.      MEDICATIONS  (STANDING):  heparin  Injectable 5000 Unit(s) SubCutaneous every 12 hours  sodium chloride 0.45%. 1000 milliLiter(s) (70 mL/Hr) IV Continuous <Continuous>  docusate sodium 100 milliGRAM(s) Oral three times a day  multivitamin 1 Tablet(s) Oral daily  insulin lispro (HumaLOG) corrective regimen sliding scale   SubCutaneous three times a day before meals  dextrose 5%. 1000 milliLiter(s) (50 mL/Hr) IV Continuous <Continuous>  dextrose 50% Injectable 12.5 Gram(s) IV Push once  dextrose 50% Injectable 25 Gram(s) IV Push once  dextrose 50% Injectable 25 Gram(s) IV Push once  piperacillin/tazobactam IVPB. 3.375 Gram(s) IV Intermittent every 8 hours  pantoprazole    Tablet 40 milliGRAM(s) Oral before breakfast    MEDICATIONS  (PRN):  dextrose Gel 1 Dose(s) Oral once PRN Blood Glucose LESS THAN 70 milliGRAM(s)/deciliter  glucagon  Injectable 1 milliGRAM(s) IntraMuscular once PRN Glucose LESS THAN 70 milligrams/deciliter  oxyCODONE  5 mG/acetaminophen 325 mG 2 Tablet(s) Oral every 4 hours PRN Moderate Pain (4 - 6)      Allergies    No Known Allergies    Intolerances          Vital Signs Last 24 Hrs  T(C): 36.8 (2017 05:32), Max: 37.3 (2017 12:52)  T(F): 98.3 (2017 05:32), Max: 99.1 (2017 12:52)  HR: 66 (2017 05:32) (62 - 99)  BP: 143/75 (2017 05:32) (143/75 - 174/81)  BP(mean): --  RR: 16 (2017 05:32) (16 - 17)  SpO2: 96% (2017 05:32) (96% - 98%)    Patient is a 62y old  Female who presents with a chief complaint of foot pain and swelling (2017 22:30)      SUBJECTIVE/OBJECTIVE:    MEDICATIONS  (STANDING):  heparin  Injectable 5000 Unit(s) SubCutaneous every 12 hours  sodium chloride 0.45%. 1000 milliLiter(s) (70 mL/Hr) IV Continuous <Continuous>  docusate sodium 100 milliGRAM(s) Oral three times a day  multivitamin 1 Tablet(s) Oral daily  insulin lispro (HumaLOG) corrective regimen sliding scale   SubCutaneous three times a day before meals  dextrose 5%. 1000 milliLiter(s) (50 mL/Hr) IV Continuous <Continuous>  dextrose 50% Injectable 12.5 Gram(s) IV Push once  dextrose 50% Injectable 25 Gram(s) IV Push once  dextrose 50% Injectable 25 Gram(s) IV Push once  piperacillin/tazobactam IVPB. 3.375 Gram(s) IV Intermittent every 8 hours  pantoprazole    Tablet 40 milliGRAM(s) Oral before breakfast    MEDICATIONS  (PRN):  dextrose Gel 1 Dose(s) Oral once PRN Blood Glucose LESS THAN 70 milliGRAM(s)/deciliter  glucagon  Injectable 1 milliGRAM(s) IntraMuscular once PRN Glucose LESS THAN 70 milligrams/deciliter  oxyCODONE  5 mG/acetaminophen 325 mG 2 Tablet(s) Oral every 4 hours PRN Moderate Pain (4 - 6)      Allergies    No Known Allergies    Intolerances          Vital Signs Last 24 Hrs  T(C): 36.8 (2017 05:32), Max: 37.3 (2017 12:52)  T(F): 98.3 (2017 05:32), Max: 99.1 (2017 12:52)  HR: 66 (2017 05:32) (62 - 99)  BP: 143/75 (2017 05:32) (143/75 - 174/81)  BP(mean): --  RR: 16 (2017 05:32) (16 - 17)  SpO2: 96% (2017 05:32) (96% - 98%)      Physical Exam:  GENERAL: NAD, well-groomed, well-developed HEAD:  Atraumatic, Normocephalic EYES: EOMI, PERRLA, conjunctiva and sclera clear ENMT: No tonsillar erythema, exudates, or enlargement; Moist mucous membranes, No lesions NECK: Supple, No JVD, Normal thyroid NERVOUS SYSTEM:  Alert & Oriented X3; Motor Strength 5/5 B/L upper and lower extremities; DTRs 2+ intact and symmetric CHEST/LUNG: Clear bilaterally; No rales, rhonchi, wheezing, or rubs HEART: Regular rate and rhythm; No murmurs, rubs, or gallops ABDOMEN: Soft, obese, Nontender, Nondistended; Bowel sounds present EXTREMITIES:  1+ pedal/ RLE edema with tenderness LYMPH: No lymphadenopathy noted SKIN: Erythema anterior RLE, ulceration 4th digit.	    Vasular: DP/PT 2/4, B/L, CFT <2 seconds B/L, Temperature gradient WNL, L, increased on R.   Neuro: Epicritic sensation intact  Wound #1:   Location: right 4th interspace  Size: 1cm x 1cm x 0cm  Wound bed: Maceration  Drainage: None  Odor: None  Periwound: Macerated      LABS:                        11.0   8.7   )-----------( 246      ( 2017 06:49 )             32.1         139  |  104  |  15  ----------------------------<  187<H>  4.2   |  26  |  0.84    Ca    8.7      2017 06:49    TPro  6.9  /  Alb  2.9<L>  /  TBili  0.7  /  DBili  x   /  AST  11<L>  /  ALT  15  /  AlkPhos  68        Urinalysis Basic - ( 2017 18:37 )    Color: Yellow / Appearance: Clear / S.010 / pH: x  Gluc: x / Ketone: Negative  / Bili: Negative / Urobili: Negative mg/dL   Blood: x / Protein: 15 mg/dL / Nitrite: Negative   Leuk Esterase: Negative / RBC: x / WBC x   Sq Epi: x / Non Sq Epi: x / Bacteria: Few      CAPILLARY BLOOD GLUCOSE  196 (2017 08:09)  223 (2017 22:28)  161 (2017 18:01)              RADIOLOGY & ADDITIONAL TESTS:        Consultant(s) Notes Reviewed:  [ ] YES  [ ] NO              LABS:                        11.0   8.7   )-----------( 246      ( 2017 06:49 )             32.1         139  |  104  |  15  ----------------------------<  187<H>  4.2   |  26  |  0.84    Ca    8.7      2017 06:49    TPro  6.9  /  Alb  2.9<L>  /  TBili  0.7  /  DBili  x   /  AST  11<L>  /  ALT  15  /  AlkPhos  68  0630      Urinalysis Basic - ( 2017 18:37 )    Color: Yellow / Appearance: Clear / S.010 / pH: x  Gluc: x / Ketone: Negative  / Bili: Negative / Urobili: Negative mg/dL   Blood: x / Protein: 15 mg/dL / Nitrite: Negative   Leuk Esterase: Negative / RBC: x / WBC x   Sq Epi: x / Non Sq Epi: x / Bacteria: Few      CAPILLARY BLOOD GLUCOSE  196 (2017 08:09)  223 (2017 22:28)  161 (2017 18:01)              RADIOLOGY & ADDITIONAL TESTS:        Consultant(s) Notes Reviewed:  [ ] YES  [ ] NO

## 2017-07-02 VITALS
HEART RATE: 68 BPM | DIASTOLIC BLOOD PRESSURE: 68 MMHG | RESPIRATION RATE: 16 BRPM | TEMPERATURE: 98 F | SYSTOLIC BLOOD PRESSURE: 142 MMHG | OXYGEN SATURATION: 100 %

## 2017-07-02 LAB
ANION GAP SERPL CALC-SCNC: 8 MMOL/L — SIGNIFICANT CHANGE UP (ref 5–17)
BUN SERPL-MCNC: 21 MG/DL — SIGNIFICANT CHANGE UP (ref 7–23)
CALCIUM SERPL-MCNC: 9.1 MG/DL — SIGNIFICANT CHANGE UP (ref 8.5–10.1)
CHLORIDE SERPL-SCNC: 103 MMOL/L — SIGNIFICANT CHANGE UP (ref 96–108)
CO2 SERPL-SCNC: 27 MMOL/L — SIGNIFICANT CHANGE UP (ref 22–31)
CREAT SERPL-MCNC: 1.11 MG/DL — SIGNIFICANT CHANGE UP (ref 0.5–1.3)
GLUCOSE SERPL-MCNC: 188 MG/DL — HIGH (ref 70–99)
HCT VFR BLD CALC: 33 % — LOW (ref 34.5–45)
HGB BLD-MCNC: 11.4 G/DL — LOW (ref 11.5–15.5)
MCHC RBC-ENTMCNC: 28.5 PG — SIGNIFICANT CHANGE UP (ref 27–34)
MCHC RBC-ENTMCNC: 34.4 GM/DL — SIGNIFICANT CHANGE UP (ref 32–36)
MCV RBC AUTO: 82.8 FL — SIGNIFICANT CHANGE UP (ref 80–100)
PLATELET # BLD AUTO: 270 K/UL — SIGNIFICANT CHANGE UP (ref 150–400)
POTASSIUM SERPL-MCNC: 4 MMOL/L — SIGNIFICANT CHANGE UP (ref 3.5–5.3)
POTASSIUM SERPL-SCNC: 4 MMOL/L — SIGNIFICANT CHANGE UP (ref 3.5–5.3)
RBC # BLD: 3.99 M/UL — SIGNIFICANT CHANGE UP (ref 3.8–5.2)
RBC # FLD: 12.7 % — SIGNIFICANT CHANGE UP (ref 11–15)
SODIUM SERPL-SCNC: 138 MMOL/L — SIGNIFICANT CHANGE UP (ref 135–145)
WBC # BLD: 7.4 K/UL — SIGNIFICANT CHANGE UP (ref 3.8–10.5)
WBC # FLD AUTO: 7.4 K/UL — SIGNIFICANT CHANGE UP (ref 3.8–10.5)

## 2017-07-02 RX ORDER — ACETAMINOPHEN 500 MG
650 TABLET ORAL EVERY 6 HOURS
Qty: 0 | Refills: 0 | Status: DISCONTINUED | OUTPATIENT
Start: 2017-07-02 | End: 2017-07-02

## 2017-07-02 RX ADMIN — Medication 4: at 12:12

## 2017-07-02 RX ADMIN — PIPERACILLIN AND TAZOBACTAM 25 GRAM(S): 4; .5 INJECTION, POWDER, LYOPHILIZED, FOR SOLUTION INTRAVENOUS at 05:28

## 2017-07-02 RX ADMIN — PANTOPRAZOLE SODIUM 40 MILLIGRAM(S): 20 TABLET, DELAYED RELEASE ORAL at 08:07

## 2017-07-02 RX ADMIN — HEPARIN SODIUM 5000 UNIT(S): 5000 INJECTION INTRAVENOUS; SUBCUTANEOUS at 05:28

## 2017-07-02 RX ADMIN — Medication 2: at 08:07

## 2017-07-02 RX ADMIN — Medication 650 MILLIGRAM(S): at 08:26

## 2017-07-02 RX ADMIN — Medication 650 MILLIGRAM(S): at 09:50

## 2017-07-02 RX ADMIN — Medication 1 TABLET(S): at 12:13

## 2017-07-02 NOTE — DISCHARGE NOTE ADULT - SECONDARY DIAGNOSIS.
Diabetic ulcer of toe of right foot associated with type 2 diabetes mellitus, unspecified ulcer stage Type 2 diabetes mellitus with other circulatory complication

## 2017-07-02 NOTE — DISCHARGE NOTE ADULT - PATIENT PORTAL LINK FT
“You can access the [employer] Patient Portal, offered by [facility], by registering with the following website: [email or web address]”

## 2017-07-02 NOTE — DISCHARGE NOTE ADULT - FINDINGS/TREATMENT
11.4   7.4   )-----------( 270      ( 02 Jul 2017 07:29 )             33.0 Hemoglobin A1C, Whole Blood: 9.5: Method: Immunoassay       Reference Range                4.0-5.6%       High risk (prediabetic)        5.7-6.4%       Diabetic, diagnostic             >=6.5%       ADA diabetic treatment goal       <7.0%  The Hemoglobin A1c reference ranges are based9.5: on the 2010 recommendations  of  The American Diabetes Association.  Interpretation may vary for children  and  adolescent % (06.30.17 @ 08:00)

## 2017-07-02 NOTE — DISCHARGE NOTE ADULT - CARE PLAN
Principal Discharge DX:	Cellulitis of right lower extremity  Goal:	resolve  Instructions for follow-up, activity and diet:	Abx,  and followup.  Secondary Diagnosis:	Diabetic ulcer of toe of right foot associated with type 2 diabetes mellitus, unspecified ulcer stage  Goal:	resolve  Instructions for follow-up, activity and diet:	As above  Podiatry followup  Secondary Diagnosis:	Type 2 diabetes mellitus with other circulatory complication  Goal:	improve  Instructions for follow-up, activity and diet:	Medication and followup

## 2017-07-02 NOTE — DISCHARGE NOTE ADULT - OTHER SIGNIFICANT FINDINGS
< from: Xray Foot AP + Lateral + Oblique, Right (06.29.17 @ 15:29) >    EXAM:  FOOT COMPLETE  3 VWS  RT                            PROCEDURE DATE:  06/29/2017        INTERPRETATION:  CLINICAL INFORMATION: Right lower extremity ulcer. Pain   and swelling for one day.    EXAM: Frontal, lateral, and oblique views of the right foot on 6/29/2017.    FINDINGS:    No radiographic evidence of large soft tissue ulceration. Mild soft   tissue swelling about the right foot. No aggressive periosteal reaction   or cortical destruction. No subcutaneous emphysema. No acute fracture or   dislocation is identified. The lisfranc alignment is intact.     IMPRESSION:    Mild soft tissue swelling about the right foot. No evidence of   subcutaneous gas, cortical destruction, or aggressive periosteal reaction.            < end of copied text >

## 2017-07-02 NOTE — DISCHARGE NOTE ADULT - CARE PROVIDER_API CALL
Dora Lacey), Medicine  [address] [place] [address]  Phone: [phone]  Fax: [phone]

## 2017-07-02 NOTE — DISCHARGE NOTE ADULT - ADDITIONAL INSTRUCTIONS
Please follow up with your primary care physician regarding your hospitalization in 1 week.  Podiatry in 1 week.

## 2017-07-02 NOTE — DISCHARGE NOTE ADULT - MEDICATION SUMMARY - MEDICATIONS TO TAKE
I will START or STAY ON the medications listed below when I get home from the hospital:    metformin 500 mg oral tablet  -- 1 tab(s) by mouth 2 times a day  -- Indication: For DM    amoxicillin-clavulanate 875 mg-125 mg oral tablet  -- 1 tab(s) by mouth every 12 hours  -- Finish all this medication unless otherwise directed by prescriber.  Take with food or milk.    -- Indication: For Cellulitis of right lower extremity    Multiple Vitamins oral tablet  -- 1 tab(s) by mouth once a day  -- Indication: For Cellulitis of right lower extremity

## 2017-07-02 NOTE — DISCHARGE NOTE ADULT - NS AS ACTIVITY OBS
Showering allowed/Stairs allowed/Return to Work/School allowed/Walking-Outdoors allowed/Sex allowed/Walking-Indoors allowed/Bathing allowed/return to work on 7-5-16

## 2017-07-02 NOTE — DISCHARGE NOTE ADULT - HOSPITAL COURSE
63yo, BF with history of DM2, PVD, phlebitis, HTN, ANIA who presents with a chief complaint of RLE redness and swelling, started yesterday but has had a history of recurrent RLE cellulitis for 17yrs. Pt was seen by Dr. Ramirez today and was instructed to go to the hospital for admission and IV abx.   Patient notes increasing pain, swelling, associated with ulcer R foot.  Denies CP, SOB, cough, palpitation, n/v/d, fever, chills, weakness, abdominal pain ,dysuria.   Admitted for Cellulitis RLE, seen by Podiatry treated with IV Zosyn, local wound care and clinical improvement noted.  The patient was discharged home in stable condition.

## 2017-07-05 LAB
CULTURE RESULTS: SIGNIFICANT CHANGE UP
CULTURE RESULTS: SIGNIFICANT CHANGE UP
SPECIMEN SOURCE: SIGNIFICANT CHANGE UP
SPECIMEN SOURCE: SIGNIFICANT CHANGE UP

## 2017-07-06 DIAGNOSIS — I10 ESSENTIAL (PRIMARY) HYPERTENSION: ICD-10-CM

## 2017-07-06 DIAGNOSIS — L03.115 CELLULITIS OF RIGHT LOWER LIMB: ICD-10-CM

## 2017-07-06 DIAGNOSIS — I80.9 PHLEBITIS AND THROMBOPHLEBITIS OF UNSPECIFIED SITE: ICD-10-CM

## 2017-07-06 DIAGNOSIS — I73.9 PERIPHERAL VASCULAR DISEASE, UNSPECIFIED: ICD-10-CM

## 2017-07-06 DIAGNOSIS — E11.621 TYPE 2 DIABETES MELLITUS WITH FOOT ULCER: ICD-10-CM

## 2017-07-06 DIAGNOSIS — Z79.84 LONG TERM (CURRENT) USE OF ORAL HYPOGLYCEMIC DRUGS: ICD-10-CM

## 2017-07-06 DIAGNOSIS — L97.519 NON-PRESSURE CHRONIC ULCER OF OTHER PART OF RIGHT FOOT WITH UNSPECIFIED SEVERITY: ICD-10-CM

## 2020-03-12 NOTE — PATIENT PROFILE ADULT. - MENTAL HEALTH CONDITIONS/SYMPTOMS, PROFILE
Writer spoke with pt previously today - see additional TE from this date - Pt contacted and states due to transportation issues, pt needs to coordinate his rides or take the bus - pt is requesting an appt next Thursdays 3/19/20 either later in the morning or early afternoon.    none

## 2020-09-29 ENCOUNTER — LABORATORY RESULT (OUTPATIENT)
Age: 65
End: 2020-09-29

## 2020-09-29 ENCOUNTER — APPOINTMENT (OUTPATIENT)
Dept: INTERNAL MEDICINE | Facility: CLINIC | Age: 65
End: 2020-09-29
Payer: MEDICARE

## 2020-09-29 VITALS — HEART RATE: 78 BPM | RESPIRATION RATE: 14 BRPM | SYSTOLIC BLOOD PRESSURE: 180 MMHG | DIASTOLIC BLOOD PRESSURE: 76 MMHG

## 2020-09-29 PROCEDURE — 36415 COLL VENOUS BLD VENIPUNCTURE: CPT

## 2020-09-29 PROCEDURE — 99203 OFFICE O/P NEW LOW 30 MIN: CPT | Mod: 25

## 2020-09-29 RX ORDER — ASPIRIN ENTERIC COATED TABLETS 81 MG 81 MG/1
81 TABLET, DELAYED RELEASE ORAL DAILY
Qty: 90 | Refills: 3 | Status: ACTIVE | COMMUNITY
Start: 2020-09-29

## 2020-09-29 RX ORDER — AMLODIPINE BESYLATE 10 MG/1
10 TABLET ORAL
Qty: 90 | Refills: 3 | Status: ACTIVE | COMMUNITY
Start: 2020-09-29 | End: 1900-01-01

## 2020-09-29 NOTE — ASSESSMENT
[FreeTextEntry1] : DM  HBA1C > 14 6 mos ago and she did not raise Glimepiride as advised\par HCVD.  Ran out of AMlodipine and poorly compliant with Lisinopril\par Hyperchol.  No labs avail\par Colonoscopy 10-20 yrs ago and refuses repeat\par mammo 9/2019 neg\par BMI 40\par Flu vax.  Afraid.

## 2020-09-29 NOTE — HEALTH RISK ASSESSMENT
[Fair] :  ~his/her~ mood as fair [No] : In the past 12 months have you used drugs other than those required for medical reasons? No [Patient reported mammogram was normal] : Patient reported mammogram was normal [Patient reported colonoscopy was normal] : Patient reported colonoscopy was normal [] : No [Audit-CScore] : 0 [MammogramDate] : 09/19 [MammogramComments] : 2 years ago [ColonoscopyComments] : 10-20 years ago

## 2020-09-29 NOTE — HISTORY OF PRESENT ILLNESS
[FreeTextEntry1] : New patient BMI 40 with DM HBA1C 14 6 mos ago.  She was told to raise Gmipepiride to bid but is still on qd. \oneyda Has hyperchol and HCVD.  Ran out of Amlodipine 10 a week ago and stopped Lisinopril a week ago.  She is aware that her BP is high.  \oneyda Refuses flu vax

## 2020-09-30 LAB
25(OH)D3 SERPL-MCNC: 16.8 NG/ML
ALBUMIN SERPL ELPH-MCNC: 4 G/DL
ALP BLD-CCNC: 92 U/L
ALT SERPL-CCNC: 12 U/L
ANION GAP SERPL CALC-SCNC: 17 MMOL/L
AST SERPL-CCNC: 18 U/L
BASOPHILS # BLD AUTO: 0.02 K/UL
BASOPHILS NFR BLD AUTO: 0.3 %
BILIRUB SERPL-MCNC: 0.4 MG/DL
BUN SERPL-MCNC: 17 MG/DL
CALCIUM SERPL-MCNC: 9.9 MG/DL
CHLORIDE SERPL-SCNC: 96 MMOL/L
CHOLEST SERPL-MCNC: 260 MG/DL
CHOLEST/HDLC SERPL: 4.2 RATIO
CO2 SERPL-SCNC: 22 MMOL/L
CREAT SERPL-MCNC: 0.84 MG/DL
EOSINOPHIL # BLD AUTO: 0.16 K/UL
EOSINOPHIL NFR BLD AUTO: 2.2 %
ESTIMATED AVERAGE GLUCOSE: 298 MG/DL
GLUCOSE SERPL-MCNC: 280 MG/DL
HBA1C MFR BLD HPLC: 12 %
HCT VFR BLD CALC: 38.3 %
HDLC SERPL-MCNC: 63 MG/DL
HGB BLD-MCNC: 11.6 G/DL
IMM GRANULOCYTES NFR BLD AUTO: 0.1 %
LDLC SERPL CALC-MCNC: 167 MG/DL
LYMPHOCYTES # BLD AUTO: 2.35 K/UL
LYMPHOCYTES NFR BLD AUTO: 32.8 %
MAN DIFF?: NORMAL
MCHC RBC-ENTMCNC: 26.9 PG
MCHC RBC-ENTMCNC: 30.3 GM/DL
MCV RBC AUTO: 88.9 FL
MONOCYTES # BLD AUTO: 0.39 K/UL
MONOCYTES NFR BLD AUTO: 5.4 %
NEUTROPHILS # BLD AUTO: 4.23 K/UL
NEUTROPHILS NFR BLD AUTO: 59.2 %
PLATELET # BLD AUTO: 272 K/UL
POTASSIUM SERPL-SCNC: 4.7 MMOL/L
PROT SERPL-MCNC: 7.4 G/DL
RBC # BLD: 4.31 M/UL
RBC # FLD: 13.9 %
SODIUM SERPL-SCNC: 134 MMOL/L
TRIGL SERPL-MCNC: 152 MG/DL
TSH SERPL-ACNC: 0.71 UIU/ML
WBC # FLD AUTO: 7.16 K/UL

## 2020-10-03 ENCOUNTER — TRANSCRIPTION ENCOUNTER (OUTPATIENT)
Age: 65
End: 2020-10-03

## 2020-12-26 ENCOUNTER — RX RENEWAL (OUTPATIENT)
Age: 65
End: 2020-12-26

## 2021-01-12 ENCOUNTER — APPOINTMENT (OUTPATIENT)
Dept: INTERNAL MEDICINE | Facility: CLINIC | Age: 66
End: 2021-01-12
Payer: MEDICARE

## 2021-01-12 VITALS — RESPIRATION RATE: 14 BRPM | DIASTOLIC BLOOD PRESSURE: 80 MMHG | HEART RATE: 70 BPM | SYSTOLIC BLOOD PRESSURE: 140 MMHG

## 2021-01-12 VITALS — WEIGHT: 219 LBS | BODY MASS INDEX: 38.79 KG/M2

## 2021-01-12 PROCEDURE — 99214 OFFICE O/P EST MOD 30 MIN: CPT | Mod: 25

## 2021-01-12 PROCEDURE — 36415 COLL VENOUS BLD VENIPUNCTURE: CPT

## 2021-01-12 NOTE — HISTORY OF PRESENT ILLNESS
[FreeTextEntry1] : F/up uncontrolled MD HCVD obesity\par Reports taking all meds now except ASA "not every day".  Admits feels better on meds.  \par Refuses vaccines.

## 2021-01-12 NOTE — PHYSICAL EXAM
[Soft] : abdomen soft [Normal] : normal gait, coordination grossly intact, no focal deficits and deep tendon reflexes were 2+ and symmetric [de-identified] : Stasis changes BLE

## 2021-01-12 NOTE — HEALTH RISK ASSESSMENT
[No] : In the past 12 months have you used drugs other than those required for medical reasons? No [No falls in past year] : Patient reported no falls in the past year [0] : 2) Feeling down, depressed, or hopeless: Not at all (0) [] : No [[id number]] : 0

## 2021-01-12 NOTE — COUNSELING
[Potential consequences of obesity discussed] : Potential consequences of obesity discussed [Benefits of weight loss discussed] : Benefits of weight loss discussed [Decrease Portions] : decrease portions [Keep Food Diary] : keep food diary

## 2021-01-13 LAB
25(OH)D3 SERPL-MCNC: 31.1 NG/ML
ALBUMIN SERPL ELPH-MCNC: 4.3 G/DL
ALP BLD-CCNC: 101 U/L
ALT SERPL-CCNC: 11 U/L
ANION GAP SERPL CALC-SCNC: 17 MMOL/L
AST SERPL-CCNC: 19 U/L
BASOPHILS # BLD AUTO: 0.04 K/UL
BASOPHILS NFR BLD AUTO: 0.5 %
BILIRUB SERPL-MCNC: 0.5 MG/DL
BUN SERPL-MCNC: 17 MG/DL
CALCIUM SERPL-MCNC: 10.1 MG/DL
CHLORIDE SERPL-SCNC: 95 MMOL/L
CHOLEST SERPL-MCNC: 261 MG/DL
CO2 SERPL-SCNC: 21 MMOL/L
CREAT SERPL-MCNC: 1.02 MG/DL
EOSINOPHIL # BLD AUTO: 0.04 K/UL
EOSINOPHIL NFR BLD AUTO: 0.5 %
ESTIMATED AVERAGE GLUCOSE: 263 MG/DL
GLUCOSE SERPL-MCNC: 268 MG/DL
HBA1C MFR BLD HPLC: 10.8 %
HCT VFR BLD CALC: 39.3 %
HDLC SERPL-MCNC: 67 MG/DL
HGB BLD-MCNC: 12.1 G/DL
IMM GRANULOCYTES NFR BLD AUTO: 0.3 %
LDLC SERPL CALC-MCNC: 171 MG/DL
LYMPHOCYTES # BLD AUTO: 2.37 K/UL
LYMPHOCYTES NFR BLD AUTO: 30.7 %
MAN DIFF?: NORMAL
MCHC RBC-ENTMCNC: 26.5 PG
MCHC RBC-ENTMCNC: 30.8 GM/DL
MCV RBC AUTO: 86 FL
MONOCYTES # BLD AUTO: 0.44 K/UL
MONOCYTES NFR BLD AUTO: 5.7 %
NEUTROPHILS # BLD AUTO: 4.8 K/UL
NEUTROPHILS NFR BLD AUTO: 62.3 %
NONHDLC SERPL-MCNC: 195 MG/DL
PLATELET # BLD AUTO: 347 K/UL
POTASSIUM SERPL-SCNC: 5.2 MMOL/L
PROT SERPL-MCNC: 7.8 G/DL
RBC # BLD: 4.57 M/UL
RBC # FLD: 14 %
SODIUM SERPL-SCNC: 132 MMOL/L
TRIGL SERPL-MCNC: 115 MG/DL
WBC # FLD AUTO: 7.71 K/UL

## 2021-01-13 RX ORDER — BLOOD SUGAR DIAGNOSTIC
STRIP MISCELLANEOUS DAILY
Qty: 100 | Refills: 3 | Status: ACTIVE | COMMUNITY
Start: 2021-01-13

## 2021-01-13 RX ORDER — BLOOD-GLUCOSE METER
KIT MISCELLANEOUS
Qty: 1 | Refills: 1 | Status: ACTIVE | COMMUNITY
Start: 2021-01-13

## 2021-01-13 RX ORDER — LANCETS 28 GAUGE
EACH MISCELLANEOUS
Qty: 100 | Refills: 3 | Status: ACTIVE | COMMUNITY
Start: 2021-01-13

## 2021-03-31 ENCOUNTER — APPOINTMENT (OUTPATIENT)
Dept: INTERNAL MEDICINE | Facility: CLINIC | Age: 66
End: 2021-03-31
Payer: MEDICARE

## 2021-03-31 VITALS — TEMPERATURE: 98 F | WEIGHT: 223 LBS | BODY MASS INDEX: 39.51 KG/M2 | HEIGHT: 63 IN

## 2021-03-31 VITALS — RESPIRATION RATE: 14 BRPM | DIASTOLIC BLOOD PRESSURE: 56 MMHG | HEART RATE: 80 BPM | SYSTOLIC BLOOD PRESSURE: 130 MMHG

## 2021-03-31 PROCEDURE — 99214 OFFICE O/P EST MOD 30 MIN: CPT

## 2021-03-31 PROCEDURE — 99072 ADDL SUPL MATRL&STAF TM PHE: CPT

## 2021-03-31 RX ORDER — LISINOPRIL AND HYDROCHLOROTHIAZIDE TABLETS 10; 12.5 MG/1; MG/1
10-12.5 TABLET ORAL DAILY
Qty: 90 | Refills: 3 | Status: DISCONTINUED | COMMUNITY
Start: 2020-09-29 | End: 2021-03-31

## 2021-03-31 RX ORDER — INSULIN GLARGINE 100 [IU]/ML
100 INJECTION, SOLUTION SUBCUTANEOUS
Qty: 3 | Refills: 3 | Status: ACTIVE | COMMUNITY
Start: 2021-03-31

## 2021-03-31 NOTE — PHYSICAL EXAM
[Soft] : abdomen soft [Normal] : normal gait, coordination grossly intact, no focal deficits and deep tendon reflexes were 2+ and symmetric [de-identified] : Stasis changes BLE

## 2021-03-31 NOTE — HISTORY OF PRESENT ILLNESS
[FreeTextEntry1] : F/up uncontrolled MD HCVD obesity\par Did not get covid vaccine\par Saw cardio Dr Perez .  Raised Lisinopril to 20 without HCTZ.  \par Saw endo.  Ordered Lantus 20.  Not started yet\par Non compliant with Crestor and is afraid that it raises her BS but willing to try another med.\par Does not reliably take Glimepiride in PM.\par  190 Max 210\par Takes ASA tiw.

## 2021-03-31 NOTE — CURRENT MEDS
[Lack of understanding] : lack of understanding [Yes] : Reviewed medication list for presence of high-risk medications. [Takes medication as prescribed] : does not take

## 2021-03-31 NOTE — HEALTH RISK ASSESSMENT
[No] : In the past 12 months have you used drugs other than those required for medical reasons? No [No falls in past year] : Patient reported no falls in the past year [0] : 2) Feeling down, depressed, or hopeless: Not at all (0) [] : No [Audit-CScore] : 0 [[id number]] : 0

## 2021-06-04 NOTE — ED ADULT NURSE NOTE - PT NEEDS ASSIST
Health Maintenance Due   Topic Date Due   • Cervical Cancer Screening  02/22/2021       Patient is due for the topics as listed above and wishes to proceed with them.  Appt scheduled to perform Cervical cancer screening     Recent PHQ 2/9 Score    PHQ 2:  Date Adult PHQ 2 Score Adult PHQ 2 Interpretation   6/4/2021 0 No further screening needed         Most Recent THEO 7 Score       Date THEO 7 Score   6/4/2021 12            no

## 2021-08-11 ENCOUNTER — APPOINTMENT (OUTPATIENT)
Dept: INTERNAL MEDICINE | Facility: CLINIC | Age: 66
End: 2021-08-11
Payer: MEDICARE

## 2021-08-11 VITALS — DIASTOLIC BLOOD PRESSURE: 68 MMHG | SYSTOLIC BLOOD PRESSURE: 136 MMHG | HEART RATE: 78 BPM | RESPIRATION RATE: 14 BRPM

## 2021-08-11 VITALS — BODY MASS INDEX: 39.34 KG/M2 | WEIGHT: 222 LBS | HEIGHT: 63 IN

## 2021-08-11 DIAGNOSIS — R79.89 OTHER SPECIFIED ABNORMAL FINDINGS OF BLOOD CHEMISTRY: ICD-10-CM

## 2021-08-11 DIAGNOSIS — Z78.9 OTHER SPECIFIED HEALTH STATUS: ICD-10-CM

## 2021-08-11 DIAGNOSIS — E78.00 PURE HYPERCHOLESTEROLEMIA, UNSPECIFIED: ICD-10-CM

## 2021-08-11 PROCEDURE — 99214 OFFICE O/P EST MOD 30 MIN: CPT

## 2021-08-11 RX ORDER — LOSARTAN POTASSIUM 50 MG/1
50 TABLET, FILM COATED ORAL
Qty: 90 | Refills: 3 | Status: ACTIVE | COMMUNITY
Start: 2021-08-11 | End: 1900-01-01

## 2021-08-11 RX ORDER — ATORVASTATIN CALCIUM 10 MG/1
10 TABLET, FILM COATED ORAL DAILY
Qty: 90 | Refills: 3 | Status: ACTIVE | COMMUNITY
Start: 2021-03-31 | End: 1900-01-01

## 2021-08-11 RX ORDER — LISINOPRIL 20 MG/1
20 TABLET ORAL
Qty: 90 | Refills: 3 | Status: DISCONTINUED | COMMUNITY
Start: 2021-03-31 | End: 2021-08-11

## 2021-08-11 NOTE — CURRENT MEDS
[Lack of understanding] : lack of understanding [Yes] : Reviewed medication list for presence of high-risk medications. [Blood Thinners] : blood thinners [Takes medication as prescribed] : does not take

## 2021-08-11 NOTE — ASSESSMENT
[FreeTextEntry1] : DM  A1C 10.8.  .  Endo ordered Lantus 20. Not taking reliably.    SHould also be more compliant with Glimepiride bid\par Chol 260 Non compliant with  Atorvastatin 10.\par Hyponatremia due to uncontrolled DM\par BMI 39\par ECHO LVH EF 75% and Holter 12/2020 normal\par Low vit D.  Compliant now with Vit D\par Derm.  Satisfied.\par Quantiferon 3/2021 at work neg.\par All vaccines declined.\par Covid vaccine.  Still wont get it.  \par Works as HHA 4 hrs per day. \par Generally poor Px due to obesity uncontrolled DM non compliance with DM and chol meds, refusal to get covid vax\par \par

## 2021-08-11 NOTE — PHYSICAL EXAM
[Soft] : abdomen soft [Normal] : normal gait, coordination grossly intact, no focal deficits and deep tendon reflexes were 2+ and symmetric [de-identified] : Stasis changes BLE

## 2021-08-11 NOTE — HISTORY OF PRESENT ILLNESS
[FreeTextEntry1] : F/up uncontrolled DM HCVD obesity\par Did not get covid vaccine\par Saw cardio Dr Perez .  Raised Lisinopril to 20 without HCTZ.  Says it makes her cough.\par Saw endo.  Ordered Lantus 20.  Not using regularly\par Non compliant with Atorvastatin\par Does not reliably take Glimepiride in PM.\par FS poor control\par Takes ASA tiw. \par Had 2 teeth extraction 2 days ago.  On Ibuprofen and ABX.

## 2021-09-13 NOTE — DIETITIAN INITIAL EVALUATION ADULT. - NS AS NUTRI DX WEIGHT
[Home] : at home, [unfilled] , at the time of the visit. [Medical Office: ([facility])___] : at the medical office located in  [Verbal consent obtained from patient] : the patient, [unfilled] [FreeTextEntry8] : The patient telephoned and requested a call back to discuss their health.  The visit was performed over the telephone as the patient was unable to be seen in the office due to the COVID 19 pandemic.  Since Friday she has had fatigue and body aches especially in the legs.  She has had a headache but that has improved.  She hasn't had a cough or shortness of breath.  She has a postnasal drip.  She was constipated but had a BM.  \par  Obese, Class III

## 2021-11-10 ENCOUNTER — RX RENEWAL (OUTPATIENT)
Age: 66
End: 2021-11-10

## 2022-01-15 ENCOUNTER — TRANSCRIPTION ENCOUNTER (OUTPATIENT)
Age: 67
End: 2022-01-15

## 2022-01-15 ENCOUNTER — INPATIENT (INPATIENT)
Facility: HOSPITAL | Age: 67
LOS: 3 days | Discharge: HOME | End: 2022-01-19
Attending: STUDENT IN AN ORGANIZED HEALTH CARE EDUCATION/TRAINING PROGRAM | Admitting: STUDENT IN AN ORGANIZED HEALTH CARE EDUCATION/TRAINING PROGRAM
Payer: MEDICARE

## 2022-01-15 VITALS
OXYGEN SATURATION: 97 % | RESPIRATION RATE: 16 BRPM | TEMPERATURE: 101 F | HEART RATE: 110 BPM | SYSTOLIC BLOOD PRESSURE: 177 MMHG | HEIGHT: 63 IN | DIASTOLIC BLOOD PRESSURE: 96 MMHG

## 2022-01-15 DIAGNOSIS — U07.1 COVID-19: ICD-10-CM

## 2022-01-15 LAB
ALBUMIN SERPL ELPH-MCNC: 3.5 G/DL — SIGNIFICANT CHANGE UP (ref 3.3–5)
ALP SERPL-CCNC: 102 U/L — SIGNIFICANT CHANGE UP (ref 40–120)
ALT FLD-CCNC: 15 U/L — SIGNIFICANT CHANGE UP (ref 4–33)
ANION GAP SERPL CALC-SCNC: 13 MMOL/L — SIGNIFICANT CHANGE UP (ref 7–14)
APTT BLD: 30.2 SEC — SIGNIFICANT CHANGE UP (ref 27–36.3)
AST SERPL-CCNC: 30 U/L — SIGNIFICANT CHANGE UP (ref 4–32)
BASOPHILS # BLD AUTO: 0 K/UL — SIGNIFICANT CHANGE UP (ref 0–0.2)
BASOPHILS NFR BLD AUTO: 0 % — SIGNIFICANT CHANGE UP (ref 0–2)
BILIRUB SERPL-MCNC: 0.6 MG/DL — SIGNIFICANT CHANGE UP (ref 0.2–1.2)
BLOOD GAS VENOUS COMPREHENSIVE RESULT: SIGNIFICANT CHANGE UP
BUN SERPL-MCNC: 14 MG/DL — SIGNIFICANT CHANGE UP (ref 7–23)
CALCIUM SERPL-MCNC: 8.8 MG/DL — SIGNIFICANT CHANGE UP (ref 8.4–10.5)
CHLORIDE SERPL-SCNC: 92 MMOL/L — LOW (ref 98–107)
CO2 SERPL-SCNC: 24 MMOL/L — SIGNIFICANT CHANGE UP (ref 22–31)
CREAT SERPL-MCNC: 1.04 MG/DL — SIGNIFICANT CHANGE UP (ref 0.5–1.3)
CRP SERPL-MCNC: 175.3 MG/L — HIGH
D DIMER BLD IA.RAPID-MCNC: 669 NG/ML DDU — HIGH
EOSINOPHIL # BLD AUTO: 0 K/UL — SIGNIFICANT CHANGE UP (ref 0–0.5)
EOSINOPHIL NFR BLD AUTO: 0 % — SIGNIFICANT CHANGE UP (ref 0–6)
FERRITIN SERPL-MCNC: 230 NG/ML — HIGH (ref 15–150)
GLUCOSE SERPL-MCNC: 385 MG/DL — HIGH (ref 70–99)
HCT VFR BLD CALC: 37.6 % — SIGNIFICANT CHANGE UP (ref 34.5–45)
HGB BLD-MCNC: 12.4 G/DL — SIGNIFICANT CHANGE UP (ref 11.5–15.5)
IANC: 5.36 K/UL — SIGNIFICANT CHANGE UP (ref 1.5–8.5)
INR BLD: 1.21 RATIO — HIGH (ref 0.88–1.16)
LYMPHOCYTES # BLD AUTO: 1.06 K/UL — SIGNIFICANT CHANGE UP (ref 1–3.3)
LYMPHOCYTES # BLD AUTO: 13.6 % — SIGNIFICANT CHANGE UP (ref 13–44)
MCHC RBC-ENTMCNC: 26.2 PG — LOW (ref 27–34)
MCHC RBC-ENTMCNC: 33 GM/DL — SIGNIFICANT CHANGE UP (ref 32–36)
MCV RBC AUTO: 79.5 FL — LOW (ref 80–100)
MONOCYTES # BLD AUTO: 0.71 K/UL — SIGNIFICANT CHANGE UP (ref 0–0.9)
MONOCYTES NFR BLD AUTO: 9.1 % — SIGNIFICANT CHANGE UP (ref 2–14)
NEUTROPHILS # BLD AUTO: 5.6 K/UL — SIGNIFICANT CHANGE UP (ref 1.8–7.4)
NEUTROPHILS NFR BLD AUTO: 70.9 % — SIGNIFICANT CHANGE UP (ref 43–77)
NT-PROBNP SERPL-SCNC: 318 PG/ML — HIGH
PLATELET # BLD AUTO: 392 K/UL — SIGNIFICANT CHANGE UP (ref 150–400)
POTASSIUM SERPL-MCNC: 3.7 MMOL/L — SIGNIFICANT CHANGE UP (ref 3.5–5.3)
POTASSIUM SERPL-SCNC: 3.7 MMOL/L — SIGNIFICANT CHANGE UP (ref 3.5–5.3)
PROT SERPL-MCNC: 7.6 G/DL — SIGNIFICANT CHANGE UP (ref 6–8.3)
PROTHROM AB SERPL-ACNC: 13.7 SEC — HIGH (ref 10.6–13.6)
RBC # BLD: 4.73 M/UL — SIGNIFICANT CHANGE UP (ref 3.8–5.2)
RBC # FLD: 13.1 % — SIGNIFICANT CHANGE UP (ref 10.3–14.5)
SARS-COV-2 RNA SPEC QL NAA+PROBE: DETECTED
SODIUM SERPL-SCNC: 129 MMOL/L — LOW (ref 135–145)
TROPONIN T, HIGH SENSITIVITY RESULT: 19 NG/L — SIGNIFICANT CHANGE UP
TROPONIN T, HIGH SENSITIVITY RESULT: 22 NG/L — SIGNIFICANT CHANGE UP
WBC # BLD: 7.8 K/UL — SIGNIFICANT CHANGE UP (ref 3.8–10.5)
WBC # FLD AUTO: 7.8 K/UL — SIGNIFICANT CHANGE UP (ref 3.8–10.5)

## 2022-01-15 PROCEDURE — 99285 EMERGENCY DEPT VISIT HI MDM: CPT | Mod: CS,25,GC

## 2022-01-15 PROCEDURE — 93010 ELECTROCARDIOGRAM REPORT: CPT | Mod: GC

## 2022-01-15 PROCEDURE — 71045 X-RAY EXAM CHEST 1 VIEW: CPT | Mod: 26

## 2022-01-15 RX ORDER — INSULIN LISPRO 100/ML
5 VIAL (ML) SUBCUTANEOUS ONCE
Refills: 0 | Status: COMPLETED | OUTPATIENT
Start: 2022-01-15 | End: 2022-01-15

## 2022-01-15 RX ORDER — SODIUM CHLORIDE 9 MG/ML
1000 INJECTION INTRAMUSCULAR; INTRAVENOUS; SUBCUTANEOUS ONCE
Refills: 0 | Status: COMPLETED | OUTPATIENT
Start: 2022-01-15 | End: 2022-01-15

## 2022-01-15 RX ORDER — ACETAMINOPHEN 500 MG
650 TABLET ORAL ONCE
Refills: 0 | Status: COMPLETED | OUTPATIENT
Start: 2022-01-15 | End: 2022-01-15

## 2022-01-15 RX ORDER — DEXAMETHASONE 0.5 MG/5ML
6 ELIXIR ORAL ONCE
Refills: 0 | Status: COMPLETED | OUTPATIENT
Start: 2022-01-15 | End: 2022-01-15

## 2022-01-15 RX ADMIN — Medication 100 MILLIGRAM(S): at 15:53

## 2022-01-15 RX ADMIN — SODIUM CHLORIDE 1000 MILLILITER(S): 9 INJECTION INTRAMUSCULAR; INTRAVENOUS; SUBCUTANEOUS at 15:41

## 2022-01-15 RX ADMIN — Medication 6 MILLIGRAM(S): at 18:47

## 2022-01-15 RX ADMIN — Medication 650 MILLIGRAM(S): at 19:31

## 2022-01-15 RX ADMIN — Medication 5 UNIT(S): at 18:11

## 2022-01-15 RX ADMIN — Medication 100 MILLIGRAM(S): at 23:08

## 2022-01-15 RX ADMIN — Medication 650 MILLIGRAM(S): at 20:30

## 2022-01-15 NOTE — ED ADULT NURSE NOTE - NSIMPLEMENTINTERV_GEN_ALL_ED
Implemented All Universal Safety Interventions:  [place] to call system. Call bell, personal items and telephone within reach. Instruct patient to call for assistance. Room bathroom lighting operational. Non-slip footwear when patient is off stretcher. Physically safe environment: no spills, clutter or unnecessary equipment. Stretcher in lowest position, wheels locked, appropriate side rails in place.

## 2022-01-15 NOTE — ED PROVIDER NOTE - CLINICAL SUMMARY MEDICAL DECISION MAKING FREE TEXT BOX
66 y/o F hx DM c/o abscess on right buttock.  Reports incidental covid found last Tuesday.  Elevated FS.  Fever possibly due to abscess but more likely covid related.  1. Abscess.  perform I&D.  Will treat with clinda as surrounding cellulitis and hx DM.  2. Covid.  report of hypoxia at .  Not hypoxic in ER.  Will observe and recheck to ensure stable for discharge.  Check labs, CXR.  3. Hyperglycemia.  Check labs.  IVNS hydration.

## 2022-01-15 NOTE — ED ADULT NURSE NOTE - OBJECTIVE STATEMENT
Pt aa&ox4 PMH HTN, DM2 Covid + 3 days ago presenting to ED from urgent care for buttock abscess, fever, and hypoxia. Pt breathing even and unlabored in ED, sating at 100% on RA. Pt febrile, received 975 Tylenol at urgent care 2 hours ago. Pt sinus tach on monitor. Pt noted to have draining abscess to right buttock. Pt states she has a hx of abscesses. Pt denies trouble breathing, cp, abdominal pain. 20g IV placed in rt ac by ems, labs sent.

## 2022-01-15 NOTE — ED PROVIDER NOTE - NSICDXPASTMEDICALHX_GEN_ALL_CORE_FT
PAST MEDICAL HISTORY:  Cellulitis of leg without foot, right     Diabetes     Diabetes     Hypertension     Hypertension     ANIA (obstructive sleep apnea)     Phlebitis

## 2022-01-15 NOTE — ED PROVIDER NOTE - OBJECTIVE STATEMENT
Attending - 68 y/o F hx DM2, c/o abscess on right buttock x 1 week.  Had telehealth appt last night and advised to go to  for I&D.  went to  today but referred to ED as covid positive.  Patient reports found to be covid positive on tuesday when she was tested just for screening.  No symptoms at that time.  Developed ageusia on Wednesday which persists.  Denies any cough, shortness of breath, or other URI symptoms.  Fevers which she attributed to having abscess.  Reports decreased PO intake recently due to ageusia.  There was a report of patient being found to have hypoxia at , but O2 sat was 100% in ED, so unclear if  reading was inaccurate.

## 2022-01-15 NOTE — ED ADULT NURSE REASSESSMENT NOTE - NS ED NURSE REASSESS COMMENT FT1
Pt continues to have intermittent episodes of hypoxia. Pt denies trouble breathing at time of event. Pt walking saturation 100% on RA. Pt remains on monitor and offers no complaints at this time.

## 2022-01-15 NOTE — ED PROVIDER NOTE - PHYSICAL EXAMINATION
ATTENDING PHYSICAL EXAM  GEN - NAD; A+O x3, Speaking comfortably.  RR 14reg.  O2 100%RA  HEAD - NC/AT; EYES/NOSE - PERRL, EOMI, mucous membranes moist, no discharge; THROAT: Oral cavity and pharynx normal. No inflammation, swelling, exudate, or lesions  NECK: Neck supple, non-tender without lymphadenopathy, no masses, no JVD  PULMONARY - CTA b/l, symmetric breath sounds, no w/r/r  CARDIAC -s1s2, RRR (HR now 93, noted tachy in triage), no M,R,G  ABDOMEN - +NABS, ND, NT, soft, no guarding, no rebound, no masses   BACK - no CVA tenderness, No vertebral or paravertebral tenderness  EXTREMITIES - symmetric pulses, 2+ dp, capillary refill < 2 seconds, no clubbing, no cyanosis, no edema  SKIN - + fluctuent abscess palpable on right buttock, spontaneous pus drainage from small point with pressure applied.  Mild surrounding erythema.

## 2022-01-15 NOTE — ED PROVIDER NOTE - PROGRESS NOTE DETAILS
Baljit Vega D.O., PGY3 (Resident)  Patient with elevated ddimer and crp, likely in setting of covid, low suspicion for VTE (labs ordered for prognostication). Patient amb 100% on RA, currently satting 89% on RA. Will give ademlog for elevated glucose, no signs of dka. Will place on 2L NC, give 6mg dexamethasone, admit. PCP Dr. Frost Baljit Vega D.O., PGY3 (Resident)  Discussed with patient and hospitalist. Admit.

## 2022-01-15 NOTE — ED ADULT TRIAGE NOTE - CHIEF COMPLAINT QUOTE
Pt w/ hx of DM HTN Covid+ 5 days ago Per EMS Pt sought evaluation of rectal abscess at urgentcare and was found to be hyperglycemic 400s, PsO2 89% on room air c/o nausea, fever

## 2022-01-15 NOTE — ED PROCEDURE NOTE - PROCEDURE ADDITIONAL DETAILS
Baljit Vega D.O., PGY3 (Resident)  Abscess borders palpated. Linear incision made over region of maximal fluctuance using #11 blade. Purulent fluid expressed. Curved Samantha used to break up loculations. Remaining abscess contents expressed. Abscess left open without packing. Sterile gauze placed over site.

## 2022-01-15 NOTE — ED PROVIDER NOTE - CARE PLAN
Principal Discharge DX:	2019 novel coronavirus disease (COVID-19)  Secondary Diagnosis:	Gluteal abscess  Secondary Diagnosis:	Hyperglycemia   1

## 2022-01-15 NOTE — ED PROVIDER NOTE - NSICDXFAMILYHX_GEN_ALL_CORE_FT
FAMILY HISTORY:  Family history of coronary arteriosclerosis    Father  Still living? Unknown  Family history of diabetes mellitus in father, Age at diagnosis: Age Unknown  Family history of hypertension in father, Age at diagnosis: Age Unknown    Mother  Still living? Unknown  Family history of diabetes mellitus in mother, Age at diagnosis: Age Unknown

## 2022-01-16 DIAGNOSIS — U07.1 COVID-19: ICD-10-CM

## 2022-01-16 DIAGNOSIS — E78.5 HYPERLIPIDEMIA, UNSPECIFIED: ICD-10-CM

## 2022-01-16 DIAGNOSIS — L02.91 CUTANEOUS ABSCESS, UNSPECIFIED: ICD-10-CM

## 2022-01-16 DIAGNOSIS — L02.31 CUTANEOUS ABSCESS OF BUTTOCK: ICD-10-CM

## 2022-01-16 DIAGNOSIS — Z29.9 ENCOUNTER FOR PROPHYLACTIC MEASURES, UNSPECIFIED: ICD-10-CM

## 2022-01-16 DIAGNOSIS — E11.9 TYPE 2 DIABETES MELLITUS WITHOUT COMPLICATIONS: ICD-10-CM

## 2022-01-16 DIAGNOSIS — I10 ESSENTIAL (PRIMARY) HYPERTENSION: ICD-10-CM

## 2022-01-16 LAB
ALBUMIN SERPL ELPH-MCNC: 2.9 G/DL — LOW (ref 3.3–5)
ALP SERPL-CCNC: 97 U/L — SIGNIFICANT CHANGE UP (ref 40–120)
ALT FLD-CCNC: 13 U/L — SIGNIFICANT CHANGE UP (ref 4–33)
AST SERPL-CCNC: 19 U/L — SIGNIFICANT CHANGE UP (ref 4–32)
BILIRUB DIRECT SERPL-MCNC: <0.2 MG/DL — SIGNIFICANT CHANGE UP (ref 0–0.3)
BILIRUB INDIRECT FLD-MCNC: >0.3 MG/DL — SIGNIFICANT CHANGE UP (ref 0–1)
BILIRUB SERPL-MCNC: 0.5 MG/DL — SIGNIFICANT CHANGE UP (ref 0.2–1.2)
CREAT SERPL-MCNC: 0.85 MG/DL — SIGNIFICANT CHANGE UP (ref 0.5–1.3)
PROCALCITONIN SERPL-MCNC: 0.19 NG/ML — HIGH (ref 0.02–0.1)
PROT SERPL-MCNC: 7.1 G/DL — SIGNIFICANT CHANGE UP (ref 6–8.3)

## 2022-01-16 PROCEDURE — 99223 1ST HOSP IP/OBS HIGH 75: CPT | Mod: GC

## 2022-01-16 RX ORDER — DEXAMETHASONE 0.5 MG/5ML
6 ELIXIR ORAL DAILY
Refills: 0 | Status: DISCONTINUED | OUTPATIENT
Start: 2022-01-16 | End: 2022-01-19

## 2022-01-16 RX ORDER — ACETAMINOPHEN 500 MG
650 TABLET ORAL EVERY 6 HOURS
Refills: 0 | Status: DISCONTINUED | OUTPATIENT
Start: 2022-01-16 | End: 2022-01-19

## 2022-01-16 RX ORDER — DEXTROSE 50 % IN WATER 50 %
15 SYRINGE (ML) INTRAVENOUS ONCE
Refills: 0 | Status: DISCONTINUED | OUTPATIENT
Start: 2022-01-16 | End: 2022-01-19

## 2022-01-16 RX ORDER — AMLODIPINE BESYLATE 2.5 MG/1
10 TABLET ORAL DAILY
Refills: 0 | Status: DISCONTINUED | OUTPATIENT
Start: 2022-01-16 | End: 2022-01-19

## 2022-01-16 RX ORDER — REMDESIVIR 5 MG/ML
200 INJECTION INTRAVENOUS EVERY 24 HOURS
Refills: 0 | Status: COMPLETED | OUTPATIENT
Start: 2022-01-16 | End: 2022-01-16

## 2022-01-16 RX ORDER — SODIUM CHLORIDE 9 MG/ML
1000 INJECTION, SOLUTION INTRAVENOUS
Refills: 0 | Status: DISCONTINUED | OUTPATIENT
Start: 2022-01-16 | End: 2022-01-16

## 2022-01-16 RX ORDER — INSULIN GLARGINE 100 [IU]/ML
19 INJECTION, SOLUTION SUBCUTANEOUS AT BEDTIME
Refills: 0 | Status: DISCONTINUED | OUTPATIENT
Start: 2022-01-16 | End: 2022-01-16

## 2022-01-16 RX ORDER — DEXTROSE 50 % IN WATER 50 %
25 SYRINGE (ML) INTRAVENOUS ONCE
Refills: 0 | Status: DISCONTINUED | OUTPATIENT
Start: 2022-01-16 | End: 2022-01-16

## 2022-01-16 RX ORDER — LANOLIN ALCOHOL/MO/W.PET/CERES
3 CREAM (GRAM) TOPICAL AT BEDTIME
Refills: 0 | Status: DISCONTINUED | OUTPATIENT
Start: 2022-01-16 | End: 2022-01-19

## 2022-01-16 RX ORDER — ENOXAPARIN SODIUM 100 MG/ML
90 INJECTION SUBCUTANEOUS EVERY 12 HOURS
Refills: 0 | Status: DISCONTINUED | OUTPATIENT
Start: 2022-01-16 | End: 2022-01-19

## 2022-01-16 RX ORDER — ATORVASTATIN CALCIUM 80 MG/1
10 TABLET, FILM COATED ORAL AT BEDTIME
Refills: 0 | Status: DISCONTINUED | OUTPATIENT
Start: 2022-01-16 | End: 2022-01-19

## 2022-01-16 RX ORDER — INSULIN LISPRO 100/ML
11 VIAL (ML) SUBCUTANEOUS
Refills: 0 | Status: DISCONTINUED | OUTPATIENT
Start: 2022-01-16 | End: 2022-01-16

## 2022-01-16 RX ORDER — SODIUM CHLORIDE 9 MG/ML
1000 INJECTION, SOLUTION INTRAVENOUS
Refills: 0 | Status: DISCONTINUED | OUTPATIENT
Start: 2022-01-16 | End: 2022-01-19

## 2022-01-16 RX ORDER — HUMAN INSULIN 100 [IU]/ML
10 INJECTION, SUSPENSION SUBCUTANEOUS ONCE
Refills: 0 | Status: COMPLETED | OUTPATIENT
Start: 2022-01-16 | End: 2022-01-16

## 2022-01-16 RX ORDER — GLUCAGON INJECTION, SOLUTION 0.5 MG/.1ML
1 INJECTION, SOLUTION SUBCUTANEOUS ONCE
Refills: 0 | Status: DISCONTINUED | OUTPATIENT
Start: 2022-01-16 | End: 2022-01-16

## 2022-01-16 RX ORDER — INSULIN LISPRO 100/ML
VIAL (ML) SUBCUTANEOUS
Refills: 0 | Status: DISCONTINUED | OUTPATIENT
Start: 2022-01-16 | End: 2022-01-17

## 2022-01-16 RX ORDER — DEXTROSE 50 % IN WATER 50 %
12.5 SYRINGE (ML) INTRAVENOUS ONCE
Refills: 0 | Status: DISCONTINUED | OUTPATIENT
Start: 2022-01-16 | End: 2022-01-16

## 2022-01-16 RX ORDER — INSULIN GLARGINE 100 [IU]/ML
19 INJECTION, SOLUTION SUBCUTANEOUS AT BEDTIME
Refills: 0 | Status: DISCONTINUED | OUTPATIENT
Start: 2022-01-16 | End: 2022-01-18

## 2022-01-16 RX ORDER — ATORVASTATIN CALCIUM 80 MG/1
1 TABLET, FILM COATED ORAL
Qty: 0 | Refills: 0 | DISCHARGE

## 2022-01-16 RX ORDER — DEXTROSE 50 % IN WATER 50 %
15 SYRINGE (ML) INTRAVENOUS ONCE
Refills: 0 | Status: DISCONTINUED | OUTPATIENT
Start: 2022-01-16 | End: 2022-01-16

## 2022-01-16 RX ORDER — DEXTROSE 50 % IN WATER 50 %
12.5 SYRINGE (ML) INTRAVENOUS ONCE
Refills: 0 | Status: DISCONTINUED | OUTPATIENT
Start: 2022-01-16 | End: 2022-01-19

## 2022-01-16 RX ORDER — DEXTROSE 50 % IN WATER 50 %
25 SYRINGE (ML) INTRAVENOUS ONCE
Refills: 0 | Status: DISCONTINUED | OUTPATIENT
Start: 2022-01-16 | End: 2022-01-19

## 2022-01-16 RX ORDER — INSULIN LISPRO 100/ML
VIAL (ML) SUBCUTANEOUS AT BEDTIME
Refills: 0 | Status: DISCONTINUED | OUTPATIENT
Start: 2022-01-16 | End: 2022-01-16

## 2022-01-16 RX ORDER — LISINOPRIL 2.5 MG/1
1 TABLET ORAL
Qty: 0 | Refills: 0 | DISCHARGE

## 2022-01-16 RX ORDER — GLUCAGON INJECTION, SOLUTION 0.5 MG/.1ML
1 INJECTION, SOLUTION SUBCUTANEOUS ONCE
Refills: 0 | Status: DISCONTINUED | OUTPATIENT
Start: 2022-01-16 | End: 2022-01-19

## 2022-01-16 RX ORDER — AMLODIPINE BESYLATE 2.5 MG/1
1 TABLET ORAL
Qty: 0 | Refills: 0 | DISCHARGE

## 2022-01-16 RX ORDER — INSULIN LISPRO 100/ML
11 VIAL (ML) SUBCUTANEOUS
Refills: 0 | Status: DISCONTINUED | OUTPATIENT
Start: 2022-01-16 | End: 2022-01-18

## 2022-01-16 RX ORDER — ENOXAPARIN SODIUM 100 MG/ML
40 INJECTION SUBCUTANEOUS
Refills: 0 | Status: DISCONTINUED | OUTPATIENT
Start: 2022-01-16 | End: 2022-01-16

## 2022-01-16 RX ORDER — INFLUENZA VIRUS VACCINE 15; 15; 15; 15 UG/.5ML; UG/.5ML; UG/.5ML; UG/.5ML
0.7 SUSPENSION INTRAMUSCULAR ONCE
Refills: 0 | Status: DISCONTINUED | OUTPATIENT
Start: 2022-01-16 | End: 2022-01-19

## 2022-01-16 RX ORDER — INSULIN LISPRO 100/ML
VIAL (ML) SUBCUTANEOUS
Refills: 0 | Status: DISCONTINUED | OUTPATIENT
Start: 2022-01-16 | End: 2022-01-16

## 2022-01-16 RX ORDER — REMDESIVIR 5 MG/ML
INJECTION INTRAVENOUS
Refills: 0 | Status: DISCONTINUED | OUTPATIENT
Start: 2022-01-16 | End: 2022-01-19

## 2022-01-16 RX ORDER — LISINOPRIL 2.5 MG/1
40 TABLET ORAL DAILY
Refills: 0 | Status: DISCONTINUED | OUTPATIENT
Start: 2022-01-16 | End: 2022-01-19

## 2022-01-16 RX ORDER — REMDESIVIR 5 MG/ML
100 INJECTION INTRAVENOUS EVERY 24 HOURS
Refills: 0 | Status: DISCONTINUED | OUTPATIENT
Start: 2022-01-17 | End: 2022-01-19

## 2022-01-16 RX ORDER — HUMAN INSULIN 100 [IU]/ML
10 INJECTION, SUSPENSION SUBCUTANEOUS ONCE
Refills: 0 | Status: DISCONTINUED | OUTPATIENT
Start: 2022-01-16 | End: 2022-01-16

## 2022-01-16 RX ADMIN — Medication 100 MILLIGRAM(S): at 21:34

## 2022-01-16 RX ADMIN — LISINOPRIL 40 MILLIGRAM(S): 2.5 TABLET ORAL at 05:44

## 2022-01-16 RX ADMIN — Medication 5: at 18:26

## 2022-01-16 RX ADMIN — Medication 11 UNIT(S): at 18:25

## 2022-01-16 RX ADMIN — HUMAN INSULIN 10 UNIT(S): 100 INJECTION, SUSPENSION SUBCUTANEOUS at 14:12

## 2022-01-16 RX ADMIN — Medication 100 MILLIGRAM(S): at 13:56

## 2022-01-16 RX ADMIN — ENOXAPARIN SODIUM 40 MILLIGRAM(S): 100 INJECTION SUBCUTANEOUS at 06:54

## 2022-01-16 RX ADMIN — Medication 12: at 08:57

## 2022-01-16 RX ADMIN — AMLODIPINE BESYLATE 10 MILLIGRAM(S): 2.5 TABLET ORAL at 05:41

## 2022-01-16 RX ADMIN — REMDESIVIR 500 MILLIGRAM(S): 5 INJECTION INTRAVENOUS at 13:03

## 2022-01-16 RX ADMIN — Medication 12: at 13:02

## 2022-01-16 RX ADMIN — Medication 100 MILLIGRAM(S): at 05:32

## 2022-01-16 RX ADMIN — Medication 650 MILLIGRAM(S): at 18:50

## 2022-01-16 RX ADMIN — Medication 6 MILLIGRAM(S): at 05:36

## 2022-01-16 RX ADMIN — ATORVASTATIN CALCIUM 10 MILLIGRAM(S): 80 TABLET, FILM COATED ORAL at 21:34

## 2022-01-16 RX ADMIN — ENOXAPARIN SODIUM 90 MILLIGRAM(S): 100 INJECTION SUBCUTANEOUS at 18:43

## 2022-01-16 RX ADMIN — Medication 650 MILLIGRAM(S): at 21:41

## 2022-01-16 RX ADMIN — INSULIN GLARGINE 19 UNIT(S): 100 INJECTION, SOLUTION SUBCUTANEOUS at 22:59

## 2022-01-16 NOTE — H&P ADULT - ASSESSMENT
Pt  is a 66F hx of DM2, HTN, HLD here with abscess on R buttock for 1 week with fever, also found to be COVID+ (unvaccinated). Abscess drained in ED with resolution of pain.  Largely asymptomatic COVID, satting well during stay here however 1/16 preliminary CXR showing L patchy peripheral opacities likely COVD pneumonia.        COVID  s/p dexamethasone in ED  -wean NC as tolerated  -daily CXRs    Diabetes  fingersticks 300s at admission, no AG, no acidosis  -q6h   -moderate sliding scale before meals and at night    Abscess  I&D in ED with resolution of pain  -continue   - f/u blood cultures, UA to r/o other sources of fever    HTN  - start home medications (lisinopril 40mg, amlodipine 10mg)    HLD  - home atorvatsatin 10mg    Prophylaxis  - lovenox 40 qD  - SCDs  - PT consult       Pt  is a 66F hx of DM2, HTN, HLD here with abscess on R buttock for 1 week with fever, also found to be COVID+ (unvaccinated). Abscess drained in ED with resolution of pain.  Largely asymptomatic COVID, satting well during stay here however 1/16 preliminary CXR showing L patchy peripheral opacities likely COVD pneumonia.        COVID  s/p dexamethasone in ED  -wean NC as tolerated  -daily CXRs    Diabetes  fingersticks 300s at admission, no AG, no acidosis  -q6h   -moderate sliding scale before meals and at night    Abscess  I&D in ED with resolution of pain  -continue clindamycin  - f/u blood cultures, UA to r/o other sources of fever    HTN  - start home medications (lisinopril 40mg, amlodipine 10mg)    HLD  - home atorvatsatin 10mg    Prophylaxis  - lovenox 40 qD  - SCDs  - PT consult       Pt  is a 66F hx of DM2, HTN, HLD here with abscess on R buttock for 1 week with fever, also found to be COVID+ (unvaccinated). Abscess drained in ED with resolution of pain.  Largely asymptomatic COVID, satting well during stay here however 1/16 preliminary CXR showing L patchy peripheral opacities likely COVD pneumonia.

## 2022-01-16 NOTE — H&P ADULT - HISTORY OF PRESENT ILLNESS
Pt  is a 66F hx of DM2, HTN, HLD here with abscess on R buttock for 1 week with fever, also found to be COVID+ (unvaccinated).   Abscess has been present for 1 week, with fevers and worsening pain. Denies any other wounds on body, no changes in mental status. During ED course, abscess was drained and she says her pain has resolved.   For her COVID, she denies any SOB, cough or other symptoms. Found to be have  Pt  is a 66F hx of DM2, HTN, HLD here with abscess on R buttock for 1 week with fever, also found to be COVID+ (unvaccinated).   Abscess has been present for 1 week, with fevers and worsening pain. Denies any other wounds on body, no changes in mental status. During ED course, abscess was drained and she says her pain has resolved. Went to urgent care for drainage earlier in the morning but states they felt abscess was too large for them to drain and directed her to ED.    For her COVID, she denies any SOB, cough or other symptoms. Reportedly hypoxic at  and placed on NC 2, but has satted well thus far here. Denies smoking history or other lung related diseases.     For her diabetes, states that blood glucose has been high recently at home to 300s. States that she has been "noncompliant" at times, including the past few days.  Home medications: metformin 2000mg qd, glimepiride 4mg, lisinopril 40mg, amlodipine 10mg, atorvastatin 10mg.     Pt  is a 66F hx of DM2, HTN, HLD here with abscess on R buttock for 1 week with fever, also found to be COVID+ (unvaccinated).   Abscess has been present for 1 week, with fevers and worsening pain. Denies any other wounds on body, no changes in mental status. During ED course, abscess was drained and she says her pain has resolved. Went to urgent care for drainage earlier in the morning but states they felt abscess was too large for them to drain and directed her to ED.    For her COVID, she denies any SOB, cough or other symptoms. Reportedly hypoxic at  and placed on NC 2, but has satted well thus far here. Denies smoking history or other lung related diseases.     For her diabetes, states that blood glucose has been high recently at home to 300s. States that she has been "noncompliant" at times, including the past few days. Sees an endocrinologist outpatient. Denies any hx of eye problems, wounds on feet, neuropathy.  Home medications: metformin 2000mg qd, glimepiride 4mg, lisinopril 40mg, amlodipine 10mg, atorvastatin 10mg.     Pt  is a 66F hx of DM2, HTN, HLD here with abscess on R buttock for 1 week with fever, also found to be COVID+ (unvaccinated).   Abscess has been present for 1 week, with fevers and worsening pain. Denies any other wounds on body, no changes in mental status. During ED course, abscess was drained and she says her pain has resolved. Went to urgent care for drainage earlier in the morning but states they felt abscess was too large for them to drain and directed her to ED.    For her COVID, she denies any SOB, cough or other symptoms. Reportedly hypoxic to 89% sat at  and placed on NC 2, but has satted well thus far here. Denies smoking history or other lung related diseases.     For her diabetes, states that blood glucose has been high recently at home to 300s. States that she has been "noncompliant" at times, including the past few days. Sees an endocrinologist outpatient. Denies any hx of eye problems, wounds on feet, neuropathy.  Home medications: metformin 2000mg qd, glimepiride 4mg, lisinopril 40mg, amlodipine 10mg, atorvastatin 10mg.

## 2022-01-16 NOTE — H&P ADULT - PROBLEM SELECTOR PLAN 1
s/p dexamethasone in ED, prelim CXR showing L patchy peripheral opacities  -wean NC as tolerated  -remdesivir 5 days (start 1/16)  - dexamethasone 6mg IV (9 days starting 1/16) s/p dexamethasone in ED, prelim CXR showing L patchy peripheral opacities, reportedly sat 89% on RA at urgent care  -wean NC as tolerated  -remdesivir 5 days (start 1/16)  - dexamethasone 6mg IV (9 days starting 1/16) O2 sat in Triage 89% on RA  s/p dexamethasone in ED, prelim CXR showing L patchy peripheral opacities, reportedly sat 89% on RA at urgent care  -wean NC as tolerated  -remdesivir 5 days (start 1/16)  - dexamethasone 6mg IV (9 days starting 1/16)

## 2022-01-16 NOTE — PROGRESS NOTE ADULT - PROBLEM SELECTOR PLAN 1
-Currently 94% on RA, breathing comfortably.   -O2 sat in ED Triage 89% on RA  -Titrate O2 to keep spo2>92%  -c/w remdesivir 5 days (start 1/16)  - c/w dexamethasone 6mg IV for 10 day course  -Pt with BMI> 30 with Dimer >2xULN will give therapeutic lovenox per protocol

## 2022-01-16 NOTE — H&P ADULT - PROBLEM SELECTOR PLAN 2
fingersticks 300-400ss at admission, no AG, no acidosis  -q6h finger sticks  -moderate sliding scale before meals and at night  -carb consistent diet I&D in ED with resolution of pain, febrile to 101.5 on admission  -continue clindamycin 7 days total (1/15 - )  -f/u blood cultures, UA to r/o other sources of fever

## 2022-01-16 NOTE — PATIENT PROFILE ADULT - FALL HARM RISK - HARM RISK INTERVENTIONS

## 2022-01-16 NOTE — H&P ADULT - NSHPREVIEWOFSYSTEMS_GEN_ALL_CORE
REVIEW OF SYSTEMS:    CONSTITUTIONAL: No weakness, fevers or chills  EYES/ENT: No visual changes;  No vertigo or throat pain   NECK: No pain or stiffness  RESPIRATORY: No cough, wheezing, hemoptysis; No shortness of breath  CARDIOVASCULAR: No chest pain or palpitations  GASTROINTESTINAL: No abdominal or epigastric pain. No nausea, vomiting, or hematemesis; No diarrhea or constipation. No melena or hematochezia.  GENITOURINARY: No dysuria, or hematuria  NEUROLOGICAL: No numbness or weakness  SKIN: No itching, rashes

## 2022-01-16 NOTE — PROGRESS NOTE ADULT - PROBLEM SELECTOR PLAN 3
fingersticks >400  give nph 10 U now  -start on Lantus 19U qhs and Admelog 11 U TID premeal, hold if not eating, per COVID steroid induced   -ISS  -carb consistent diet

## 2022-01-16 NOTE — PROGRESS NOTE ADULT - PROBLEM SELECTOR PLAN 2
I&D in ED with resolution of pain, febrile to 101.5 on admission  -continue clindamycin 7 days total (1/15 - )  -I don't think Abscess cultures were sent.   -f/u blood cultures, UA to r/o other sources of fever

## 2022-01-16 NOTE — PROVIDER CONTACT NOTE (OTHER) - ACTION/TREATMENT ORDERED:
Provider notified and insulin given as per sliding scale ordered. No further orders at this time.
Provider notified. sliding scale given as per order. No further orders at this time.
Provider notified and NPH insulin given as per order. No further orders at this time. recheck blood glucose pre meal for dinner.
Provider notified. insulin given as per order. No further orders at this time.

## 2022-01-16 NOTE — PROVIDER CONTACT NOTE (OTHER) - BACKGROUND
patient admit with COVID and abscess of R buttock
patient admit with buttock abscess and COVID
patient admit with COVID and abscess of R buttock
patient admit with buttock abscess and COVID

## 2022-01-16 NOTE — H&P ADULT - ATTENDING COMMENTS
PT with DM, HTN, HLD diagnosed with COVID 19 5 days ago p/w R buttock abscess.  s/p I+D in ED.  Pt with some cough, but no other complaints at this time.  On exam, pt in NAD.  heart tachy/regular.  Lungs CTA B.  R buttock with mild erythema s/p I+D  Labs reviewed  EKG Tracing reviewed showing sinus tachycardia.      COVID 19 with hypoxia  - dexamethasone and remdesivir    Will continue clindamycin started in ED for R buttock abscess  f/u cultures    Hold PO DM meds.  Give Admelog sliding scale.

## 2022-01-16 NOTE — PROVIDER CONTACT NOTE (OTHER) - REASON
patient blood glucose level 537
pre lunch blood sugar 493
patient blood glucose level 431 repeat immediately 451
pre meal blood glucose for dinner 395

## 2022-01-16 NOTE — PHYSICAL THERAPY INITIAL EVALUATION ADULT - PERTINENT HX OF CURRENT PROBLEM, REHAB EVAL
Patient is 79 year old female admitted with history of DM2, HTN, HLD here with abscess on right buttock for 1 week with fever, also found to be COVID+

## 2022-01-16 NOTE — H&P ADULT - PROBLEM SELECTOR PLAN 3
I&D in ED with resolution of pain, febrile to 101.5 on admission  -continue clindamycin 7 days total (1/15 - )  -f/u blood cultures, UA to r/o other sources of fever fingersticks 300-400ss at admission, no AG, no acidosis  -q6h finger sticks  -moderate sliding scale before meals and at night  -carb consistent diet

## 2022-01-16 NOTE — H&P ADULT - NSHPPHYSICALEXAM_GEN_ALL_CORE
VITALS:   T(C): 37 (01-15-22 @ 23:00), Max: 38.6 (01-15-22 @ 14:39)  HR: 76 (01-15-22 @ 23:00) (76 - 110)  BP: 164/82 (01-15-22 @ 23:00) (164/82 - 198/87)  RR: 18 (01-15-22 @ 23:00) (16 - 20)  SpO2: 98% (01-15-22 @ 23:00) (94% - 100%)    PHYSICAL EXAM:     GENERAL: NAD, obese, lying in bed comfortably  HEAD:  Atraumatic, Normocephalic  EYES: EOMI, PERRLA, conjunctiva and sclera clear  ENT: Moist mucous membranes  NECK: Supple, No JVD  CHEST/LUNG: Clear to auscultation bilaterally; No respiratory distress.  HEART: Regular rate and rhythm; No murmurs, rubs, or gallops  ABDOMEN: normal bowel sounds; Soft, nontender, nondistended  EXTREMITIES:  2+ Peripheral Pulses, brisk capillary refill. No clubbing, cyanosis, or edema. No wounds on feet.  Neurological:  A&Ox3, no focal deficits   SKIN: Dressing at R buttock where abscess was drained, no bleeding, pus noted around dressing. Non-tender to palpation.  PSYCH: normal affect and mood

## 2022-01-16 NOTE — PROGRESS NOTE ADULT - ASSESSMENT
66F with mhx of DM2, HTN, HLD here with abscess on R buttock for 1 week with fever, also found to be COVID+ (unvaccinated).

## 2022-01-17 ENCOUNTER — TRANSCRIPTION ENCOUNTER (OUTPATIENT)
Age: 67
End: 2022-01-17

## 2022-01-17 DIAGNOSIS — E11.65 TYPE 2 DIABETES MELLITUS WITH HYPERGLYCEMIA: ICD-10-CM

## 2022-01-17 DIAGNOSIS — I10 ESSENTIAL (PRIMARY) HYPERTENSION: ICD-10-CM

## 2022-01-17 DIAGNOSIS — R73.9 HYPERGLYCEMIA, UNSPECIFIED: ICD-10-CM

## 2022-01-17 LAB
A1C WITH ESTIMATED AVERAGE GLUCOSE RESULT: 13.2 % — HIGH (ref 4–5.6)
ESTIMATED AVERAGE GLUCOSE: 332 — SIGNIFICANT CHANGE UP
HCV AB S/CO SERPL IA: 0.1 S/CO — SIGNIFICANT CHANGE UP (ref 0–0.99)
HCV AB SERPL-IMP: SIGNIFICANT CHANGE UP

## 2022-01-17 PROCEDURE — 99222 1ST HOSP IP/OBS MODERATE 55: CPT | Mod: GC

## 2022-01-17 PROCEDURE — 99233 SBSQ HOSP IP/OBS HIGH 50: CPT

## 2022-01-17 RX ORDER — INSULIN LISPRO 100/ML
VIAL (ML) SUBCUTANEOUS
Refills: 0 | Status: DISCONTINUED | OUTPATIENT
Start: 2022-01-17 | End: 2022-01-19

## 2022-01-17 RX ORDER — INSULIN LISPRO 100/ML
VIAL (ML) SUBCUTANEOUS AT BEDTIME
Refills: 0 | Status: DISCONTINUED | OUTPATIENT
Start: 2022-01-17 | End: 2022-01-19

## 2022-01-17 RX ORDER — INSULIN LISPRO 100/ML
VIAL (ML) SUBCUTANEOUS AT BEDTIME
Refills: 0 | Status: DISCONTINUED | OUTPATIENT
Start: 2022-01-17 | End: 2022-01-17

## 2022-01-17 RX ADMIN — Medication 11 UNIT(S): at 08:45

## 2022-01-17 RX ADMIN — Medication 100 MILLIGRAM(S): at 12:49

## 2022-01-17 RX ADMIN — Medication 11 UNIT(S): at 12:48

## 2022-01-17 RX ADMIN — ENOXAPARIN SODIUM 90 MILLIGRAM(S): 100 INJECTION SUBCUTANEOUS at 17:42

## 2022-01-17 RX ADMIN — INSULIN GLARGINE 19 UNIT(S): 100 INJECTION, SOLUTION SUBCUTANEOUS at 21:31

## 2022-01-17 RX ADMIN — Medication 6 MILLIGRAM(S): at 05:49

## 2022-01-17 RX ADMIN — REMDESIVIR 500 MILLIGRAM(S): 5 INJECTION INTRAVENOUS at 12:54

## 2022-01-17 RX ADMIN — Medication 100 MILLIGRAM(S): at 05:51

## 2022-01-17 RX ADMIN — Medication 2: at 17:41

## 2022-01-17 RX ADMIN — AMLODIPINE BESYLATE 10 MILLIGRAM(S): 2.5 TABLET ORAL at 05:49

## 2022-01-17 RX ADMIN — ENOXAPARIN SODIUM 90 MILLIGRAM(S): 100 INJECTION SUBCUTANEOUS at 05:51

## 2022-01-17 RX ADMIN — Medication 3 MILLIGRAM(S): at 21:33

## 2022-01-17 RX ADMIN — LISINOPRIL 40 MILLIGRAM(S): 2.5 TABLET ORAL at 05:52

## 2022-01-17 RX ADMIN — Medication 100 MILLIGRAM(S): at 21:32

## 2022-01-17 RX ADMIN — ATORVASTATIN CALCIUM 10 MILLIGRAM(S): 80 TABLET, FILM COATED ORAL at 21:33

## 2022-01-17 RX ADMIN — Medication 11 UNIT(S): at 17:42

## 2022-01-17 NOTE — CONSULT NOTE ADULT - SUBJECTIVE AND OBJECTIVE BOX
ENDOCRINE NEW CONSULT - Uncontrolled DM2   HPI:  Pt  is a 66F hx of DM2, HTN, HLD here with abscess on R buttock for 1 week with fever, also found to be COVID+ (unvaccinated).   Abscess has been present for 1 week, with fevers and worsening pain. Denies any other wounds on body, no changes in mental status. During ED course, abscess was drained and she says her pain has resolved. Went to urgent care for drainage earlier in the morning but states they felt abscess was too large for them to drain and directed her to ED.    For her COVID, she denies any SOB, cough or other symptoms. Reportedly hypoxic to 89% sat at  and placed on NC 2, but has satted well thus far here. Denies smoking history or other lung related diseases.     For her diabetes, states that blood glucose has been high recently at home to 300s. States that she has been "noncompliant" at times, including the past few days. Sees an endocrinologist outpatient. Denies any hx of eye problems, wounds on feet, neuropathy.  Home medications: metformin 2000mg qd, glimepiride 4mg, lisinopril 40mg, amlodipine 10mg, atorvastatin 10mg.     (16 Jan 2022 01:10)      PAST MEDICAL & SURGICAL HISTORY:  Diabetes    Hypertension    Diabetes    Hypertension    Phlebitis    Cellulitis of leg without foot, right    ANIA (obstructive sleep apnea)    No significant past surgical history        FAMILY HISTORY:  Family history of coronary arteriosclerosis    Family history of hypertension in father (Father)    Family history of diabetes mellitus in father (Father)    Family history of diabetes mellitus in mother (Mother)      Social History:    Home Medications:  amLODIPine 10 mg oral tablet: 1 tab(s) orally once a day (16 Jan 2022 01:32)  atorvastatin 10 mg oral tablet: 1 tab(s) orally once a day (16 Jan 2022 01:32)  glimepiride 4 mg oral tablet: 1 tab(s) orally once a day (16 Jan 2022 01:32)  lisinopril 40 mg oral tablet: 1 tab(s) orally once a day (16 Jan 2022 01:32)  metFORMIN 1000 mg oral tablet: 1 tab(s) orally 2 times a day (16 Jan 2022 01:32)    MEDICATIONS  (STANDING):  amLODIPine   Tablet 10 milliGRAM(s) Oral daily  atorvastatin 10 milliGRAM(s) Oral at bedtime  clindamycin IVPB 600 milliGRAM(s) IV Intermittent every 8 hours  dexAMETHasone  Injectable 6 milliGRAM(s) IV Push daily  dextrose 40% Gel 15 Gram(s) Oral once  dextrose 5%. 1000 milliLiter(s) (50 mL/Hr) IV Continuous <Continuous>  dextrose 5%. 1000 milliLiter(s) (100 mL/Hr) IV Continuous <Continuous>  dextrose 50% Injectable 25 Gram(s) IV Push once  dextrose 50% Injectable 12.5 Gram(s) IV Push once  dextrose 50% Injectable 25 Gram(s) IV Push once  enoxaparin Injectable 90 milliGRAM(s) SubCutaneous every 12 hours  glucagon  Injectable 1 milliGRAM(s) IntraMuscular once  influenza  Vaccine (HIGH DOSE) 0.7 milliLiter(s) IntraMuscular once  insulin glargine Injectable (LANTUS) 19 Unit(s) SubCutaneous at bedtime  insulin lispro (ADMELOG) corrective regimen sliding scale   SubCutaneous three times a day before meals  insulin lispro Injectable (ADMELOG) 11 Unit(s) SubCutaneous three times a day before meals  lisinopril 40 milliGRAM(s) Oral daily  remdesivir  IVPB   IV Intermittent   remdesivir  IVPB 100 milliGRAM(s) IV Intermittent every 24 hours    MEDICATIONS  (PRN):  acetaminophen     Tablet .. 650 milliGRAM(s) Oral every 6 hours PRN Temp greater or equal to 38C (100.4F), Mild Pain (1 - 3)  melatonin 3 milliGRAM(s) Oral at bedtime PRN Insomnia      Allergies    No Known Allergies    Intolerances      Review of Systems:  Constitutional: No fever  Eyes: No blurry vision  Neuro: No tremors  HEENT: No pain  Cardiovascular: No chest pain, palpitations  Respiratory: No SOB, no cough  GI: No nausea, vomiting, abdominal pain  : No dysuria  Skin: no rash  Psych: no depression  Endocrine: no polyuria, polydipsia  Hem/lymph: no swelling  Osteoporosis: no fractures    ALL OTHER SYSTEMS REVIEWED AND NEGATIVE    UNABLE TO OBTAIN    PHYSICAL EXAM:  VITALS: T(C): 37.7 (01-17-22 @ 05:38)  T(F): 99.8 (01-17-22 @ 05:38), Max: 100.4 (01-16-22 @ 18:50)  HR: 96 (01-17-22 @ 05:38) (88 - 96)  BP: 159/76 (01-17-22 @ 05:38) (141/70 - 160/89)  RR:  (18 - 18)  SpO2:  (95% - 98%)  Wt(kg): --  GENERAL: NAD, well-groomed, well-developed  EYES: No proptosis, no lid lag, anicteric  HEENT:  Atraumatic, Normocephalic, moist mucous membranes  THYROID: Normal size, no palpable nodules  RESPIRATORY: Clear to auscultation bilaterally; No rales, rhonchi, wheezing  CARDIOVASCULAR: Regular rate and rhythm; No murmurs; no peripheral edema  GI: Soft, nontender, non distended, normal bowel sounds  SKIN: Dry, intact, No rashes or lesions  MUSCULOSKELETAL: Full range of motion, normal strength  NEURO: sensation intact, extraocular movements intact, no tremor  PSYCH: Alert and oriented x 3, normal affect, normal mood  CUSHING'S SIGNS: no striae    POCT Blood Glucose.: 102 mg/dL (01-17-22 @ 08:41)  POCT Blood Glucose.: 232 mg/dL (01-16-22 @ 22:57)  POCT Blood Glucose.: 395 mg/dL (01-16-22 @ 18:06)  POCT Blood Glucose.: 537 mg/dL (01-16-22 @ 14:08)  POCT Blood Glucose.: 493 mg/dL (01-16-22 @ 12:19)  POCT Blood Glucose.: 451 mg/dL (01-16-22 @ 08:47)  POCT Blood Glucose.: 431 mg/dL (01-16-22 @ 08:45)  POCT Blood Glucose.: 271 mg/dL (01-15-22 @ 23:09)  POCT Blood Glucose.: 329 mg/dL (01-15-22 @ 20:24)  POCT Blood Glucose.: 417 mg/dL (01-15-22 @ 17:28)                            12.4   7.80  )-----------( 392      ( 15 Tom 2022 15:59 )             37.6       01-16    132<L>  |  95<L>  |  16  ----------------------------<  424<H>  4.7   |  17<L>  |  0.85    EGFR if : 82  EGFR if non : 70    Ca    9.0      01-16  Mg     2.00     01-16  Phos  4.3     01-16    TPro  7.1  /  Alb  2.9<L>  /  TBili  0.5  /  DBili  <0.2  /  AST  19  /  ALT  13  /  AlkPhos  97  01-16      Thyroid Function Tests:                Radiology:              ENDOCRINE NEW CONSULT - Uncontrolled DM2   HPI:  Pt  is a 66F hx of DM2, HTN, HLD here with abscess on R buttock for 1 week with fever, also found to be COVID+ (unvaccinated).   Abscess has been present for 1 week, with fevers and worsening pain. Denies any other wounds on body, no changes in mental status. During ED course, abscess was drained and she says her pain has resolved. Went to urgent care for drainage earlier in the morning but states they felt abscess was too large for them to drain and directed her to ED.    For her COVID, she denies any SOB, cough or other symptoms. Reportedly hypoxic to 89% sat at  and placed on NC 2, but has satted well thus far here. Denies smoking history or other lung related diseases.     For her diabetes, states that blood glucose has been high recently at home to 300s. States that she has been "noncompliant" at times, including the past few days. Sees an endocrinologist outpatient. Denies any hx of eye problems, wounds on feet, neuropathy.  Home medications: metformin 2000mg qd, glimepiride 4mg, lisinopril 40mg, amlodipine 10mg, atorvastatin 10mg.     (16 Jan 2022 01:10)    DM2:   Patient reports that she was diagnosed with DM2 in 1990. Has a strong family history of diabetes; two brothers passed from DM2 related kidney disease per pt. Patient reports that she has been noncompliant thus far and realizes now that she needs to control her dm2 better.  Follows with an endocrinologist in [facility], does not recall name, not affiliated / [facility].   Currently only on Metformin 2gm QHS and Glimepiride 4mg BID which patient admits she has not been taking regularly. Was prescribed to be on 4 insulin injections per day about 3-4 years ago but did not take this as she says "I just didn't like it, it was too much for me". Does not check FS regularly at home, when she does check its usually fasting in the morning and levels are always in the 200s.   Denies known retinopathy, neuropathy, nephropathy, cad/cva.   UTD w/ optho & podiatry per patient.   In terms of her diet, she states that it is a problem. She lives with multiple family members that are all picky and she often eats a lot of carbs & snacks. Does not exercise regularly.       PAST MEDICAL & SURGICAL HISTORY:  Diabetes    Hypertension    Diabetes    Hypertension    Phlebitis    Cellulitis of leg without foot, right    ANIA (obstructive sleep apnea)    No significant past surgical history        FAMILY HISTORY:  Family history of coronary arteriosclerosis    Family history of hypertension in father (Father)    Family history of diabetes mellitus in father (Father)    Family history of diabetes mellitus in mother (Mother)    Social History:    Home Medications:  amLODIPine 10 mg oral tablet: 1 tab(s) orally once a day (16 Jan 2022 01:32)  atorvastatin 10 mg oral tablet: 1 tab(s) orally once a day (16 Jan 2022 01:32)  glimepiride 4 mg oral tablet: 1 tab(s) orally once a day (16 Jan 2022 01:32)  lisinopril 40 mg oral tablet: 1 tab(s) orally once a day (16 Jan 2022 01:32)  metFORMIN 1000 mg oral tablet: 1 tab(s) orally 2 times a day (16 Jan 2022 01:32)    MEDICATIONS  (STANDING):  amLODIPine   Tablet 10 milliGRAM(s) Oral daily  atorvastatin 10 milliGRAM(s) Oral at bedtime  clindamycin IVPB 600 milliGRAM(s) IV Intermittent every 8 hours  dexAMETHasone  Injectable 6 milliGRAM(s) IV Push daily  dextrose 40% Gel 15 Gram(s) Oral once  dextrose 5%. 1000 milliLiter(s) (50 mL/Hr) IV Continuous <Continuous>  dextrose 5%. 1000 milliLiter(s) (100 mL/Hr) IV Continuous <Continuous>  dextrose 50% Injectable 25 Gram(s) IV Push once  dextrose 50% Injectable 12.5 Gram(s) IV Push once  dextrose 50% Injectable 25 Gram(s) IV Push once  enoxaparin Injectable 90 milliGRAM(s) SubCutaneous every 12 hours  glucagon  Injectable 1 milliGRAM(s) IntraMuscular once  influenza  Vaccine (HIGH DOSE) 0.7 milliLiter(s) IntraMuscular once  insulin glargine Injectable (LANTUS) 19 Unit(s) SubCutaneous at bedtime  insulin lispro (ADMELOG) corrective regimen sliding scale   SubCutaneous three times a day before meals  insulin lispro Injectable (ADMELOG) 11 Unit(s) SubCutaneous three times a day before meals  lisinopril 40 milliGRAM(s) Oral daily  remdesivir  IVPB   IV Intermittent   remdesivir  IVPB 100 milliGRAM(s) IV Intermittent every 24 hours    MEDICATIONS  (PRN):  acetaminophen     Tablet .. 650 milliGRAM(s) Oral every 6 hours PRN Temp greater or equal to 38C (100.4F), Mild Pain (1 - 3)  melatonin 3 milliGRAM(s) Oral at bedtime PRN Insomnia      Allergies    No Known Allergies    Intolerances      Review of Systems:  Constitutional: No fever  Eyes: No blurry vision  Neuro: No tremors  HEENT: No pain  Cardiovascular: No chest pain, palpitations  Respiratory: No SOB, no cough  GI: No nausea, vomiting, abdominal pain  : No dysuria  Skin: +wound  Psych: no depression  Endocrine: no polyuria, polydipsia  Hem/lymph: no swelling  Osteoporosis: no fractures  ALL OTHER SYSTEMS REVIEWED AND NEGATIVE    PHYSICAL EXAM:  VITALS: T(C): 37.7 (01-17-22 @ 05:38)  T(F): 99.8 (01-17-22 @ 05:38), Max: 100.4 (01-16-22 @ 18:50)  HR: 96 (01-17-22 @ 05:38) (88 - 96)  BP: 159/76 (01-17-22 @ 05:38) (141/70 - 160/89)  RR:  (18 - 18)  SpO2:  (95% - 98%)  Wt(kg): --  GENERAL: NAD, obese female   EYES: No proptosis, no lid lag, anicteric  HEENT:  Atraumatic, Normocephalic, moist mucous membranes  THYROID: Normal size, no palpable nodules  RESPIRATORY: nonlabored respirations, speaking fluently in full sentences, on nasal cannula supplemental oxygen   CARDIOVASCULAR: Regular rate and rhythm; No murmurs; no peripheral edema  GI: Soft, nontender, non distended, normal bowel sounds  MUSCULOSKELETAL: Full range of motion  NEURO: sensation intact, extraocular movements intact, no tremor  PSYCH: Alert and oriented x 3, normal affect, normal mood    POCT Blood Glucose.: 102 mg/dL (01-17-22 @ 08:41)  POCT Blood Glucose.: 232 mg/dL (01-16-22 @ 22:57)  POCT Blood Glucose.: 395 mg/dL (01-16-22 @ 18:06)  POCT Blood Glucose.: 537 mg/dL (01-16-22 @ 14:08)  POCT Blood Glucose.: 493 mg/dL (01-16-22 @ 12:19)  POCT Blood Glucose.: 451 mg/dL (01-16-22 @ 08:47)  POCT Blood Glucose.: 431 mg/dL (01-16-22 @ 08:45)  POCT Blood Glucose.: 271 mg/dL (01-15-22 @ 23:09)  POCT Blood Glucose.: 329 mg/dL (01-15-22 @ 20:24)  POCT Blood Glucose.: 417 mg/dL (01-15-22 @ 17:28)    A1C 13.2% (01.17.22 @ 07:27)                            12.4   7.80  )-----------( 392      ( 15 Tom 2022 15:59 )             37.6       01-16    132<L>  |  95<L>  |  16  ----------------------------<  424<H>  4.7   |  17<L>  |  0.85    EGFR if : 82  EGFR if non : 70    Ca    9.0      01-16  Mg     2.00     01-16  Phos  4.3     01-16    TPro  7.1  /  Alb  2.9<L>  /  TBili  0.5  /  DBili  <0.2  /  AST  19  /  ALT  13  /  AlkPhos  97  01-16      Thyroid Function Tests:                Radiology:

## 2022-01-17 NOTE — CONSULT NOTE ADULT - ATTENDING COMMENTS
Poorly controlled Type 2 DM, pt reports poor compliance with medication. Now adm for abscess, added steroids for Covid. Agree with need for aggressive basal bolus regimen. Pt advised of need to take DM care more seriously upon discharge.

## 2022-01-17 NOTE — PROGRESS NOTE ADULT - PROBLEM SELECTOR PLAN 3
-Currently 95% on RA, breathing comfortably.   -O2 sat in ED Triage 89% on RA  -Titrate O2 to keep spo2>92%  -c/w remdesivir 5 days (start 1/16)  - c/w dexamethasone 6mg IV for 10 day course  -Pt with BMI> 30 with Dimer >2xULN will give therapeutic lovenox per protocol

## 2022-01-17 NOTE — PROGRESS NOTE ADULT - PROBLEM SELECTOR PLAN 2
I&D in ED with resolution of pain, febrile to 101.5 on admission  -continue clindamycin 7 days total (1/15 - )  -I don't think Abscess cultures were sent.   -f/u blood cultures, NTD

## 2022-01-17 NOTE — CONSULT NOTE ADULT - ASSESSMENT
Uncontrolled DM2   Steroid Induced Hyperglycemia   A1c 13.2  Recommendations:   - Lantus SC QHS   - Admelog SC Premeal/TIDAC   - Moderate Admelog Correction Scale Premeal & Moderate Correction Scale Bedtime   - Goal -180  - Blood glucose monitoring Premeal/Bedtime   - Hypoglycemia protocol   - Carb Consistent Diet   - Nutrition consult   - Provider to RN Diabetes Teaching     DC Planning: basal/bolus + metformin vs Metformin + basal + GLP-1?     HLD  Recommendations:   - LDL goal < 70   - currently on statin   - outpatient fasting lipid profile     HTN  Recommendations:   - BP goal < 130/80   - currently on ACEi   - defer to primary team     Discussed w/ Dr. Diann Diehl, DO   Endocrinology Fellow   Please call [phone] after hours and on weekends/holidays.    66 yo F with DM2, HTN, HLD admitted for R. buttock abscess, incidentally found to be COVID+ & hypoxic, currently being treated with decadron (1/15-1/24).      Uncontrolled DM2   Steroid Induced Hyperglycemia   A1c 13.2  Recommendations:   - Goal -180  - Continue w/ Lantus 19units SC QHS   - Continue w/ Admelog 11units SC Premeal/TIDAC   - Moderate Admelog Correction Scale Premeal & Moderate Correction Scale Bedtime   - Blood glucose monitoring Premeal/Bedtime   - Hypoglycemia protocol   - Carb Consistent Diet   - Nutrition consult   - Provider to RN Diabetes Teaching     DC Planning: basal/bolus + metformin vs Metformin + basal + GLP-1, to be determined depending on insulin requirements. Please send test scripts to patient's pharmacy for basal insulin pen (ie. Basaglar, Lantus, Tresiba, Toujeo, Levemir) and bolus insulin pen(ie. humalog/novolog/admelog) depending on insurance coverage, to check which is covered. Patient will require nutrition consult & diabetes teaching prior to discharge. Patient will also require prescriptions for diabetes supplies (glucometer, test strips, lancets, alcohol swabs, pen needles, etc.) prior to discharge. Patient can follow up with her private endocrinologist in [facility] or at the location provided below:     [facility]   [address] [place] [id number]  [phone]    HLD  Recommendations:   - LDL goal < 70   - currently on statin   - outpatient fasting lipid profile     HTN  Recommendations:   - BP goal < 130/80   - currently on ACEi   - defer to primary team     Discussed w/ Dr. Krishnan.     Diann Diehl DO   Endocrinology Fellow   Please call [phone] after hours and on weekends/holidays.

## 2022-01-17 NOTE — PROGRESS NOTE ADULT - PROBLEM SELECTOR PLAN 1
fingersticks >500 yesterday, improved this morning.   A1c=13.2, endocrine consulted  -Will need Insuloin and diabetic teaching prior to discharge  - Lantus 19U qhs and Admelog 11 U TID premeal, hold if not eating, per COVID steroid induced   -Titrate Insulin as needed  -f/u endocrine recs  -Moderate ISS  -carb consistent diet

## 2022-01-18 ENCOUNTER — TRANSCRIPTION ENCOUNTER (OUTPATIENT)
Age: 67
End: 2022-01-18

## 2022-01-18 LAB
ALBUMIN SERPL ELPH-MCNC: 2.5 G/DL — LOW (ref 3.3–5)
ALP SERPL-CCNC: 79 U/L — SIGNIFICANT CHANGE UP (ref 40–120)
ALT FLD-CCNC: 12 U/L — SIGNIFICANT CHANGE UP (ref 4–33)
AST SERPL-CCNC: 20 U/L — SIGNIFICANT CHANGE UP (ref 4–32)
BILIRUB DIRECT SERPL-MCNC: <0.2 MG/DL — SIGNIFICANT CHANGE UP (ref 0–0.3)
BILIRUB INDIRECT FLD-MCNC: >0.5 MG/DL — SIGNIFICANT CHANGE UP (ref 0–1)
BILIRUB SERPL-MCNC: 0.7 MG/DL — SIGNIFICANT CHANGE UP (ref 0.2–1.2)
CREAT SERPL-MCNC: 0.89 MG/DL — SIGNIFICANT CHANGE UP (ref 0.5–1.3)
GLUCOSE BLDC GLUCOMTR-MCNC: 146 MG/DL — HIGH (ref 70–99)
GLUCOSE BLDC GLUCOMTR-MCNC: 205 MG/DL — HIGH (ref 70–99)
INR BLD: 1.18 RATIO — HIGH (ref 0.88–1.16)
PROT SERPL-MCNC: 6.9 G/DL — SIGNIFICANT CHANGE UP (ref 6–8.3)
PROTHROM AB SERPL-ACNC: 13.3 SEC — SIGNIFICANT CHANGE UP (ref 10.6–13.6)

## 2022-01-18 PROCEDURE — 99232 SBSQ HOSP IP/OBS MODERATE 35: CPT

## 2022-01-18 RX ORDER — ISOPROPYL ALCOHOL, BENZOCAINE .7; .06 ML/ML; ML/ML
1 SWAB TOPICAL
Qty: 100 | Refills: 1
Start: 2022-01-18 | End: 2022-03-08

## 2022-01-18 RX ORDER — INSULIN LISPRO 100/ML
11 VIAL (ML) SUBCUTANEOUS
Qty: 3 | Refills: 0
Start: 2022-01-18 | End: 2022-02-16

## 2022-01-18 RX ORDER — INSULIN GLARGINE 100 [IU]/ML
22 INJECTION, SOLUTION SUBCUTANEOUS AT BEDTIME
Refills: 0 | Status: DISCONTINUED | OUTPATIENT
Start: 2022-01-18 | End: 2022-01-19

## 2022-01-18 RX ORDER — INSULIN LISPRO 100/ML
13 VIAL (ML) SUBCUTANEOUS
Refills: 0 | Status: DISCONTINUED | OUTPATIENT
Start: 2022-01-18 | End: 2022-01-19

## 2022-01-18 RX ORDER — INSULIN GLARGINE 100 [IU]/ML
22 INJECTION, SOLUTION SUBCUTANEOUS
Qty: 3 | Refills: 0
Start: 2022-01-18 | End: 2022-02-16

## 2022-01-18 RX ORDER — ENOXAPARIN SODIUM 100 MG/ML
15 INJECTION SUBCUTANEOUS
Qty: 5 | Refills: 0
Start: 2022-01-18 | End: 2022-02-16

## 2022-01-18 RX ORDER — INSULIN ASPART 100 [IU]/ML
7 INJECTION, SOLUTION SUBCUTANEOUS
Qty: 5 | Refills: 0
Start: 2022-01-18 | End: 2022-02-16

## 2022-01-18 RX ADMIN — AMLODIPINE BESYLATE 10 MILLIGRAM(S): 2.5 TABLET ORAL at 05:29

## 2022-01-18 RX ADMIN — Medication 6 MILLIGRAM(S): at 05:29

## 2022-01-18 RX ADMIN — ENOXAPARIN SODIUM 90 MILLIGRAM(S): 100 INJECTION SUBCUTANEOUS at 05:29

## 2022-01-18 RX ADMIN — Medication 100 MILLIGRAM(S): at 05:28

## 2022-01-18 RX ADMIN — ENOXAPARIN SODIUM 90 MILLIGRAM(S): 100 INJECTION SUBCUTANEOUS at 17:29

## 2022-01-18 RX ADMIN — Medication 100 MILLIGRAM(S): at 22:23

## 2022-01-18 RX ADMIN — Medication 11 UNIT(S): at 12:19

## 2022-01-18 RX ADMIN — Medication 6: at 08:57

## 2022-01-18 RX ADMIN — REMDESIVIR 500 MILLIGRAM(S): 5 INJECTION INTRAVENOUS at 12:18

## 2022-01-18 RX ADMIN — Medication 13 UNIT(S): at 17:33

## 2022-01-18 RX ADMIN — LISINOPRIL 40 MILLIGRAM(S): 2.5 TABLET ORAL at 05:29

## 2022-01-18 RX ADMIN — ATORVASTATIN CALCIUM 10 MILLIGRAM(S): 80 TABLET, FILM COATED ORAL at 21:30

## 2022-01-18 RX ADMIN — INSULIN GLARGINE 22 UNIT(S): 100 INJECTION, SOLUTION SUBCUTANEOUS at 21:29

## 2022-01-18 RX ADMIN — Medication 100 MILLIGRAM(S): at 12:51

## 2022-01-18 RX ADMIN — Medication 11 UNIT(S): at 08:56

## 2022-01-18 RX ADMIN — Medication 4: at 17:32

## 2022-01-18 RX ADMIN — Medication 8: at 12:20

## 2022-01-18 NOTE — DISCHARGE NOTE PROVIDER - NSFOLLOWUPCLINICS_GEN_ALL_ED_FT
[facility] Endocrinology  Endocrinology  [address] [place] [address]  Phone: [phone]  Fax:   Follow Up Time: 1 week

## 2022-01-18 NOTE — DISCHARGE NOTE PROVIDER - HOSPITAL COURSE
66F with mhx of DM2, HTN, HLD here with abscess on R buttock for 1 week with fever, also found to be COVID+ (unvaccinated).    Problem/Plan - 1:  ·  Problem: Type 2 diabetes mellitus.   ·  Plan: fingersticks >500 yesterday, improved this morning.   A1c=13.2, endocrine consulted  -Will need Insuloin and diabetic teaching prior to discharge  - Lantus 19U qhs and Admelog 11 U TID premeal, hold if not eating, per COVID steroid induced   -Titrate Insulin as needed  -f/u endocrine recs  -Moderate ISS  -carb consistent diet.    Problem/Plan - 2:  ·  Problem: Abscess of buttock, right.   ·  Plan: I&D in ED with resolution of pain, febrile to 101.5 on admission  -continue clindamycin 7 days total (1/15 - )  -I don't think Abscess cultures were sent.   -f/u blood cultures, NTD.    Problem/Plan - 3:  ·  Problem: COVID-19.   ·  Plan: -Currently 95% on RA, breathing comfortably.   -O2 sat in ED Triage 89% on RA  -Titrate O2 to keep spo2>92%  -c/w remdesivir 5 days (start 1/16)  - c/w dexamethasone 6mg IV for 10 day course  -Pt with BMI> 30 with Dimer >2xULN will give therapeutic lovenox per protocol.    Problem/Plan - 4:  ·  Problem: Hypertension.   ·  Plan: -c/w lisinopril 40mg  -c/w home amlodipine 10mg.    Problem/Plan - 5:  ·  Problem: Hyperlipidemia.   ·  Plan: -c/w home atorvastatin.    Problem/Plan - 6:  ·  Problem: Prophylactic measure.   ·  Plan: -lovenox 90mg BID   -PT consult  -Wound Consult.    66F with mhx of DM2, HTN, HLD here with abscess on R buttock for 1 week with fever, also found to be COVID+ (unvaccinated).    Hospital Course   ·Type 2 diabetes mellitus.   ·  Plan: fingersticks >500 yesterday, improved this morning.   A1c=13.2, endocrine consulted  -Will need Insuloin and diabetic teaching prior to discharge  will Be  D/C On lantus 15 u, Novolog 6 Units three times a day , Metformin 500 mg BID  will plan to Monitor CReat, If Stable grey few weeks, Pt Can go Back to 1000 mg BID   -carb consistent diet.    Problem/Plan - 2:  ·  Problem: Abscess of buttock, right.   ·  Plan: I&D in ED with resolution of pain, febrile to 101.5 on admission  -continue clindamycin 7 days total (1/15 - )    Problem/Plan - 3:  ·  Problem: COVID-19.   ·  Plan: -Currently 95% on RA, breathing comfortably.   -O2 sat in ED Triage 89% on RA  s/p Dexa/Remdisivir     Problem/Plan - 4:  ·  Problem: Hypertension.   ·  Plan: -c/w lisinopril 40mg  -c/w home amlodipine 10mg.    Problem/Plan - 5:  ·  Problem: Hyperlipidemia.   ·  Plan: -c/w home atorvastatin.  case discussed with attending , Pt is stable for discharge.

## 2022-01-18 NOTE — DISCHARGE NOTE PROVIDER - NSDCFUADDINST_GEN_ALL_CORE_FT
Topical Recommendation:  Right buttock: Clean with NS or soap and water at home. Pat dry. Place small Silicone foam with borders. Change every other day. Recommend  to use soft cotton underwear at home.

## 2022-01-18 NOTE — PROGRESS NOTE ADULT - PROBLEM SELECTOR PLAN 1
fingersticks >500 yesterday, improved this morning.   A1c=13.2, endocrine consulted  -Will need Insuloin and diabetic teaching prior to discharge  - Lantus 19U qhs and Admelog 11 U TID premeal, hold if not eating, per COVID steroid induced   -Titrate Insulin as needed  -f/u endocrine recs  -Moderate ISS  -carb consistent diet fingersticks >500 yesterday, improved this morning.   A1c=13.2, endocrine consulted  - diabetes teaching, insulin teaching.   - send supplies for patient's pharmacy.   - patient educated on medication compliance. she will follow up with own endocrinologist.   - patient will NOT require steroids on discharge.

## 2022-01-18 NOTE — ADVANCED PRACTICE NURSE CONSULT - RECOMMEDATIONS
Follow up with endocrine outpatient   If abscesses recurs  then Recommend follow up care at [facility]: [phone]. Address: [address] [place] [address].     Topical Recommendation:  Right buttock: Clean with NS or soap and water at home. Pat dry. Place small Silicone foam with borders. Change every other day. Recommend  to use soft cotton underwear at home.     Please contact Wound Care Service Line if we can be of further assistance (ext [phone]).  Patient pending discharge to home.   Follow up with endocrine outpatient   If abscesses recurs  then Recommend follow up care at [facility]: [phone]. Address: [address] [place] [address].     Topical Recommendation:  Right buttock: Clean with NS or soap and water at home. Pat dry. Place small Silicone foam with borders. Change every other day. Recommend  to use soft cotton underwear at home.     Please contact Wound Care Service Line if we can be of further assistance (ext [phone]).

## 2022-01-18 NOTE — ADVANCED PRACTICE NURSE CONSULT - REASON FOR CONSULT
Patient seen on skin care rounds after wound care referral received for assessment of skin impairment and recommendations of topical management. Chart reviewed: Bandar 20, WBC 7.80, Hemoglobin A1C 13.2%, D dimer 669, COVID 19+, patient interviewed, reports previous abscess in the skin, she goes to Urgent care when she gets them and received antibiotics and they resolved. Patient H/O of  DM2, HTN, HLD here with abscess on R buttock for 1 week with fever, also found to be COVID+ (unvaccinated). Abscess has been present for 1 week, with fevers and worsening pain. Denies any other wounds on body, no changes in mental status. During ED course, abscess was drained and she says her pain has resolved. Went to urgent care for drainage earlier in the morning but states they felt abscess was too large for them to drain and directed her to ED.

## 2022-01-18 NOTE — PROGRESS NOTE ADULT - PROBLEM SELECTOR PLAN 3
-Currently 95% on RA, breathing comfortably.   -O2 sat in ED Triage 89% on RA  -Titrate O2 to keep spo2>92%  -c/w remdesivir 5 days (start 1/16)  - c/w dexamethasone 6mg IV for 10 day course  -Pt with BMI> 30 with Dimer >2xULN will give therapeutic lovenox per protocol -Currently 95% on RA, breathing comfortably.   -O2 sat in ED Triage 89% on RA  -Titrate O2 to keep spo2>92%  -c/w remdesivir 5 days (start 1/16)  - c/w dexamethasone 6mg IV for 10 day course  -Pt with BMI> 30 with Dimer >2xULN will give therapeutic lovenox per protocol  - clinically improved quickly now on RA. can stop Remdesivir and Dexametahsone on discharge.   - d/c home on Xarelto 10mg qd for DVT ppx for 30 days.

## 2022-01-18 NOTE — ADVANCED PRACTICE NURSE CONSULT - ASSESSMENT
Patient AXO4. Independent in care. Left heel with stable linear fissure. No drainage.     Moisture associated dermatitis sin bilateral buttocks and extending to posterior thighs as evident by hyperpigmentation Areas of hypopigmentation from previous skin impairment.     Right buttock with linear opening abscess s/p I&D in the ER- Entire area measures 1mhn9di (Area of mild induration w/o bogginess or fluctuance). Patient reports pain improved. Pinpoint opening 0.2cmx0.2cm. Unable to determine depth given narrow opening, it appears superficial. No drainage able to be express with palpation. Small amount of serosanguinous drainage in foam dressing. Patient denies tenderness. Goals of : monitor for tissue type changes, ongoing glucose control and education.   *Educated patient about concerning symptoms that will require to return to ER. Patient verbalized understanding.     Supplies provided for discharge for at least two weeks.  Patient AXO4. Independent in care. Left heel with stable linear fissure. No drainage.     Moisture associated dermatitis sin bilateral buttocks and extending to posterior thighs as evident by hyperpigmentation Areas of hypopigmentation from previous skin impairment.     Right buttock with linear opening abscess s/p I&D in the ER- Entire area measures 8ghj2wt (Area of mild induration w/o bogginess or fluctuance). Patient reports pain improved. Pinpoint opening 0.2cmx0.2cm. Unable to determine depth given narrow opening, it appears superficial. No drainage able to be express with palpation. Small amount of serosanguinous drainage in foam dressing. Patient denies tenderness. Goals of : monitor for tissue type changes, ongoing glucose control and education.   *Educated patient about concerning symptoms that will require to return to ER. Patient verbalized understanding.     Supplies provided for discharge for at least two weeks.

## 2022-01-18 NOTE — DISCHARGE NOTE PROVIDER - NSDCMRMEDTOKEN_GEN_ALL_CORE_FT
amLODIPine 10 mg oral tablet: 1 tab(s) orally once a day  atorvastatin 10 mg oral tablet: 1 tab(s) orally once a day  glimepiride 4 mg oral tablet: 1 tab(s) orally once a day  lisinopril 40 mg oral tablet: 1 tab(s) orally once a day  metFORMIN 1000 mg oral tablet: 1 tab(s) orally 2 times a day   alcohol swabs : Apply topically to affected area 4 times a day   amLODIPine 10 mg oral tablet: 1 tab(s) orally once a day  atorvastatin 10 mg oral tablet: 1 tab(s) orally once a day  Basaglar KwikPen 100 units/mL subcutaneous solution: 22 unit(s) subcutaneous once a day (at bedtime)   glimepiride 4 mg oral tablet: 1 tab(s) orally once a day  glucometer (per patient&#x27;s insurance): Test blood sugars four times a day. Dispense #1 glucometer.  HumaLOG KwikPen 100 units/mL injectable solution: 11 unit(s) subcutaneous 3 times a day (before meals)   Insulin Pen Needles, 4mm: 1 application subcutaneously 4 times a day. ** Use with insulin pen **   lancets: 1 application subcutaneously 4 times a day   lisinopril 40 mg oral tablet: 1 tab(s) orally once a day  metFORMIN 1000 mg oral tablet: 1 tab(s) orally 2 times a day  test strips (per patient&#x27;s insurance): 1 application subcutaneously 4 times a day. ** Compatible with patient&#x27;s glucometer **   alcohol swabs : Apply topically to affected area 3 times a day     Dx code E11.9  amLODIPine 10 mg oral tablet: 1 tab(s) orally once a day  atorvastatin 10 mg oral tablet: 1 tab(s) orally once a day  glimepiride 4 mg oral tablet: 1 tab(s) orally once a day  glucometer (per patient&#x27;s insurance): 1 application subcutaneous 3 times a day     Dx code E11.9  Insulin Pen Needles, 4mm: 1 application subcutaneously 3 times a day . ** Use with insulin pen **     Dx code E11.9  lancets: 1 application subcutaneously 3 times a day     Dx code E11.9  Lantus Solostar Pen 100 units/mL subcutaneous solution: 15 unit(s) subcutaneous once a day   lisinopril 40 mg oral tablet: 1 tab(s) orally once a day  metFORMIN 1000 mg oral tablet: 1 tab(s) orally 2 times a day  NovoLOG FlexPen 100 units/mL injectable solution: 7 unit(s) subcutaneous 3 times a day (with meals)   test strips (per patient&#x27;s insurance): 1 application subcutaneously 3 times a day . ** Compatible with patient&#x27;s glucometer **     Dx code E11.9   amLODIPine 10 mg oral tablet: 1 tab(s) orally once a day  atorvastatin 10 mg oral tablet: 1 tab(s) orally once a day  clindamycin 300 mg oral capsule: 1 cap(s) orally every 8 hours   Lantus Solostar Pen 100 units/mL subcutaneous solution: 15 unit(s) subcutaneous once a day   lisinopril 40 mg oral tablet: 1 tab(s) orally once a day  metFORMIN 500 mg oral tablet: 1 tab(s) orally 2 times a day   NovoLOG FlexPen 100 units/mL injectable solution: 6 unit(s) subcutaneous 3 times a day (with meals)   Xarelto 10 mg oral tablet: 1 tab(s) orally once a day

## 2022-01-18 NOTE — PROGRESS NOTE ADULT - ASSESSMENT
66F with mhx of DM2, HTN, HLD here with abscess on R buttock for 1 week with fever, also found to be COVID+ (unvaccinated).              66F with mhx of DM2, HTN, HLD here with abscess on R buttock for 1 week with fever, also found to be COVID+ (unvaccinated). Patient was briefly hypoxic, now on RA w/o further symptoms. abscess was drained in ED, and healing well at thist julia.

## 2022-01-18 NOTE — DISCHARGE NOTE NURSING/CASE MANAGEMENT/SOCIAL WORK - NSDCPEFALRISK_GEN_ALL_CORE
For information on Fall & Injury Prevention, visit: [email or web address] OR  [email or web address] OR  https://www.cdc.gov/steadi/patient.html

## 2022-01-18 NOTE — DISCHARGE NOTE NURSING/CASE MANAGEMENT/SOCIAL WORK - PATIENT PORTAL LINK FT
You can access the FollowMyHealth Patient Portal offered by [facility] by registering at the following website: [email or web address]. By joining [facility]’s FollowMyHealth portal, you will also be able to view your health information using other applications (apps) compatible with our system.

## 2022-01-18 NOTE — PROGRESS NOTE ADULT - PROBLEM SELECTOR PLAN 2
I&D in ED with resolution of pain, febrile to 101.5 on admission  -continue clindamycin 7 days total (1/15 - )  -I don't think Abscess cultures were sent.   -f/u blood cultures, NTD I&D in ED with resolution of pain, febrile to 101.5 on admission  -continue clindamycin 7 days total (1/15 - )  - no abscess cultures. improvinging clinically on Clindamycin, will transition to PO on discharge.   -f/u blood cultures, NTD

## 2022-01-18 NOTE — PROGRESS NOTE ADULT - ASSESSMENT
66 yo F with DM2, HTN, HLD admitted for R. buttock abscess, incidentally found to be COVID+ & hypoxic, currently being treated with decadron (1/15-1/24).      Uncontrolled DM2   Steroid Induced Hyperglycemia   A1c 13.2  Recommendations:   - Goal -180  - Increase Lantus to 22 units SC QHS   - Increase Admelog to 13 units SC Premeal/TIDAC   - Moderate Admelog Correction Scale Premeal & Moderate Correction Scale Bedtime   - Blood glucose monitoring Premeal/Bedtime   - Hypoglycemia protocol   - Carb Consistent Diet   - Nutrition consult   - Provider to RN Diabetes Teaching     DC Planning: basal/bolus + metformin vs Metformin + basal + GLP-1, to be determined depending on insulin requirements. Please send test scripts to patient's pharmacy for basal insulin pen (ie. Basaglar, Lantus, Tresiba, Toujeo, Levemir) and bolus insulin pen(ie. humalog/novolog/admelog) depending on insurance coverage, to check which is covered.- can send with current dosing  Patient will require nutrition consult & diabetes teaching prior to discharge. Please ensure bedside RN has taught insulin pens.   Patient will also require prescriptions for diabetes supplies (glucometer, test strips, lancets, alcohol swabs, pen needles, etc.) prior to discharge. Patient can follow up with her private endocrinologist in [facility] or at the location provided below:     [facility]   [address] [place] [address]  [phone]    HLD  Recommendations:   - LDL goal < 70   - currently on statin   - outpatient fasting lipid profile     HTN  Recommendations:   - BP goal < 130/80   - currently on ACEi   - defer to primary team     Vee Ricardo  Nurse Practitioner  Division of Endocrinology & Diabetes  Pager # [phone]      If after 6PM or before 9AM, or on weekends/holidays, please call endocrine answering service for assistance ([phone]).  For nonurgent matters email [email or web address] for assistance.   68 yo F with DM2, HTN, HLD admitted for R. buttock abscess, incidentally found to be COVID+ & hypoxic, currently being treated with decadron (1/15-1/24).      Uncontrolled DM2   Steroid Induced Hyperglycemia   A1c 13.2  Recommendations:   - Goal -180  - Increase Lantus to 22 units SC QHS   - Increase Admelog to 13 units SC Premeal/TIDAC   - Moderate Admelog Correction Scale Premeal & Moderate Correction Scale Bedtime   - Blood glucose monitoring Premeal/Bedtime   - Hypoglycemia protocol   - Carb Consistent Diet   - Nutrition consult   - Provider to RN Diabetes Teaching     DC Planning: basal/bolus + metformin - Lantus solostar pen 15 units sq qhs and Novolog flex pen 6 units tid ac- hold if skips meal.  These are covered by insurance  Patient will require nutrition consult & diabetes teaching prior to discharge. Please ensure bedside RN has taught insulin pens.   Patient will also require prescriptions for diabetes supplies (glucometer, test strips, lancets, alcohol swabs, pen needles, etc.) prior to discharge. Patient can follow up with her private endocrinologist in [facility] or at the location provided below:     [facility]   [address] [place] [address]  [phone]    HLD  Recommendations:   - LDL goal < 70   - currently on statin   - outpatient fasting lipid profile     HTN  Recommendations:   - BP goal < 130/80   - currently on ACEi   - defer to primary team     Vee Ricardo  Nurse Practitioner  Division of Endocrinology & Diabetes  Pager # [phone]      If after 6PM or before 9AM, or on weekends/holidays, please call endocrine answering service for assistance ([phone]).  For nonurgent matters email [email or web address] for assistance.

## 2022-01-19 VITALS — WEIGHT: 209.22 LBS

## 2022-01-19 LAB
ALBUMIN SERPL ELPH-MCNC: 2.8 G/DL — LOW (ref 3.3–5)
ALP SERPL-CCNC: 81 U/L — SIGNIFICANT CHANGE UP (ref 40–120)
ALT FLD-CCNC: 13 U/L — SIGNIFICANT CHANGE UP (ref 4–33)
AST SERPL-CCNC: 20 U/L — SIGNIFICANT CHANGE UP (ref 4–32)
BILIRUB DIRECT SERPL-MCNC: <0.2 MG/DL — SIGNIFICANT CHANGE UP (ref 0–0.3)
BILIRUB INDIRECT FLD-MCNC: >0.4 MG/DL — SIGNIFICANT CHANGE UP (ref 0–1)
BILIRUB SERPL-MCNC: 0.6 MG/DL — SIGNIFICANT CHANGE UP (ref 0.2–1.2)
CREAT SERPL-MCNC: 1.17 MG/DL — SIGNIFICANT CHANGE UP (ref 0.5–1.3)
GLUCOSE BLDC GLUCOMTR-MCNC: 152 MG/DL — HIGH (ref 70–99)
GLUCOSE BLDC GLUCOMTR-MCNC: 260 MG/DL — HIGH (ref 70–99)
INR BLD: 1.16 RATIO — SIGNIFICANT CHANGE UP (ref 0.88–1.16)
PROT SERPL-MCNC: 7.3 G/DL — SIGNIFICANT CHANGE UP (ref 6–8.3)
PROTHROM AB SERPL-ACNC: 13.3 SEC — SIGNIFICANT CHANGE UP (ref 10.6–13.6)

## 2022-01-19 PROCEDURE — 99239 HOSP IP/OBS DSCHRG MGMT >30: CPT

## 2022-01-19 RX ORDER — RIVAROXABAN 15 MG-20MG
1 KIT ORAL
Qty: 30 | Refills: 0
Start: 2022-01-19 | End: 2022-02-17

## 2022-01-19 RX ORDER — ENOXAPARIN SODIUM 100 MG/ML
15 INJECTION SUBCUTANEOUS
Qty: 5 | Refills: 0
Start: 2022-01-19 | End: 2022-02-17

## 2022-01-19 RX ORDER — INSULIN ASPART 100 [IU]/ML
6 INJECTION, SOLUTION SUBCUTANEOUS
Qty: 5 | Refills: 0
Start: 2022-01-19 | End: 2022-02-17

## 2022-01-19 RX ORDER — METFORMIN HYDROCHLORIDE 850 MG/1
1 TABLET ORAL
Qty: 0 | Refills: 0 | DISCHARGE

## 2022-01-19 RX ORDER — METFORMIN HYDROCHLORIDE 850 MG/1
1 TABLET ORAL
Qty: 60 | Refills: 0
Start: 2022-01-19 | End: 2022-02-17

## 2022-01-19 RX ORDER — GLIMEPIRIDE 1 MG
1 TABLET ORAL
Qty: 0 | Refills: 0 | DISCHARGE

## 2022-01-19 RX ADMIN — ENOXAPARIN SODIUM 90 MILLIGRAM(S): 100 INJECTION SUBCUTANEOUS at 05:41

## 2022-01-19 RX ADMIN — LISINOPRIL 40 MILLIGRAM(S): 2.5 TABLET ORAL at 05:39

## 2022-01-19 RX ADMIN — Medication 100 MILLIGRAM(S): at 05:41

## 2022-01-19 RX ADMIN — AMLODIPINE BESYLATE 10 MILLIGRAM(S): 2.5 TABLET ORAL at 05:38

## 2022-01-19 RX ADMIN — Medication 13 UNIT(S): at 12:01

## 2022-01-19 RX ADMIN — Medication 6 MILLIGRAM(S): at 05:39

## 2022-01-19 RX ADMIN — Medication 13 UNIT(S): at 09:29

## 2022-01-19 RX ADMIN — Medication 2: at 09:28

## 2022-01-19 RX ADMIN — Medication 6: at 13:15

## 2022-01-19 NOTE — DIETITIAN INITIAL EVALUATION ADULT. - HEIGHT FOR BMI (CENTIMETERS)
Called patient, spoke with: Patient regarding the results of the patients most recent labs. I advised Patient of Floyce Milder recommendations.    Patient did voice understanding 160.02

## 2022-01-19 NOTE — DIETITIAN INITIAL EVALUATION ADULT. - PROBLEM SELECTOR PLAN 2
I&D in ED with resolution of pain, febrile to 101.5 on admission  -continue clindamycin 7 days total (1/15 - )  -f/u blood cultures, UA to r/o other sources of fever

## 2022-01-19 NOTE — PROGRESS NOTE ADULT - SUBJECTIVE AND OBJECTIVE BOX
[facility] Division of Hospital Medicine  Neel Perez MD  In House Pager [phone]    Patient is a 66y old  Female who presents with a chief complaint of R buttock abscess, COVID+ (18 Jan 2022 17:30)      SUBJECTIVE / OVERNIGHT EVENTS:  No overnight events. Labs and vitals reviewed.  Patient seen and examined at bedside, no acute complaints.  No fever, no chills, no SOB, no CP, no n/v/d, no abd pain, no dysuria      MEDICATIONS  (STANDING):  amLODIPine   Tablet 10 milliGRAM(s) Oral daily  atorvastatin 10 milliGRAM(s) Oral at bedtime  clindamycin IVPB 600 milliGRAM(s) IV Intermittent every 8 hours  dexAMETHasone  Injectable 6 milliGRAM(s) IV Push daily  dextrose 40% Gel 15 Gram(s) Oral once  dextrose 5%. 1000 milliLiter(s) (50 mL/Hr) IV Continuous <Continuous>  dextrose 5%. 1000 milliLiter(s) (100 mL/Hr) IV Continuous <Continuous>  dextrose 50% Injectable 25 Gram(s) IV Push once  dextrose 50% Injectable 12.5 Gram(s) IV Push once  dextrose 50% Injectable 25 Gram(s) IV Push once  enoxaparin Injectable 90 milliGRAM(s) SubCutaneous every 12 hours  glucagon  Injectable 1 milliGRAM(s) IntraMuscular once  influenza  Vaccine (HIGH DOSE) 0.7 milliLiter(s) IntraMuscular once  insulin glargine Injectable (LANTUS) 22 Unit(s) SubCutaneous at bedtime  insulin lispro (ADMELOG) corrective regimen sliding scale   SubCutaneous three times a day before meals  insulin lispro (ADMELOG) corrective regimen sliding scale   SubCutaneous at bedtime  insulin lispro Injectable (ADMELOG) 13 Unit(s) SubCutaneous three times a day before meals  lisinopril 40 milliGRAM(s) Oral daily  remdesivir  IVPB   IV Intermittent   remdesivir  IVPB 100 milliGRAM(s) IV Intermittent every 24 hours    MEDICATIONS  (PRN):  acetaminophen     Tablet .. 650 milliGRAM(s) Oral every 6 hours PRN Temp greater or equal to 38C (100.4F), Mild Pain (1 - 3)  melatonin 3 milliGRAM(s) Oral at bedtime PRN Insomnia    CAPILLARY BLOOD GLUCOSE      POCT Blood Glucose.: 152 mg/dL (19 Jan 2022 09:17)  POCT Blood Glucose.: 146 mg/dL (18 Jan 2022 21:01)  POCT Blood Glucose.: 205 mg/dL (18 Jan 2022 17:24)  POCT Blood Glucose.: 314 mg/dL (18 Jan 2022 12:12)    I&O's Summary      PHYSICAL EXAM:  Vital Signs Last 24 Hrs  T(C): 36.6 (19 Jan 2022 05:30), Max: 36.7 (18 Jan 2022 11:30)  T(F): 97.8 (19 Jan 2022 05:30), Max: 98 (18 Jan 2022 11:30)  HR: 68 (19 Jan 2022 05:30) (65 - 76)  BP: 140/67 (19 Jan 2022 05:30) (130/67 - 145/72)  BP(mean): --  RR: 17 (19 Jan 2022 05:30) (17 - 17)  SpO2: 94% (19 Jan 2022 05:30) (90% - 98%)    Gen: NAD; sitting in bed comfortably   Pulm: no accessory muscle use; lungs clear on auscultation bilaterally; no wheezing or crackles.   Cards: RRR, nl S1/S2; no LE edema; no JVD  Abd: non-distended; soft and NT on exam; +bs  Ext: OSCAR; no joint effusion or tenderness in upper and lower extremities; no cyanosis  Neuro: Awake and Alert; non-focal; moving all extremities.   Skin: right buttocks area of hyperpigmentation. non-tender on palpation; no local induration. no drainage from prior I&D sites.     LABS:    01-19    x   |  x   |  x   ----------------------------<  x   x    |  x   |  1.17      TPro  7.3  /  Alb  2.8<L>  /  TBili  0.6  /  DBili  <0.2  /  AST  20  /  ALT  13  /  AlkPhos  81  01-19    PT/INR - ( 19 Jan 2022 07:40 )   PT: 13.3 sec;   INR: 1.16 ratio                     RADIOLOGY & ADDITIONAL TESTS:  Results Reviewed: Y  Imaging Personally Reviewed: Y  Electrocardiogram Personally Reviewed: Y    COORDINATION OF CARE:  Care Discussed with Consultants/Other Providers [Y/N]: Y  Prior or Outpatient Records Reviewed [Y/N]: Y  
Arpan Ovalles, Internal Medicine  Pager, [phone], [phone]    PROGRESS NOTE:     Patient is a 66y old  Female who presents with a chief complaint of R buttock abscess, COVID+ (16 Jan 2022 01:10)      SUBJECTIVE / OVERNIGHT EVENTS: No acute overnight events. Pt states she is feeling well, denies fever, chills, nausea, vomiting, abd pain, shortness of breath, chest pain, light headedness or dizzines. SHe states pain from abscess that was I&D'd is much better.     ADDITIONAL REVIEW OF SYSTEMS:    MEDICATIONS  (STANDING):  amLODIPine   Tablet 10 milliGRAM(s) Oral daily  atorvastatin 10 milliGRAM(s) Oral at bedtime  clindamycin IVPB 600 milliGRAM(s) IV Intermittent every 8 hours  dexAMETHasone  Injectable 6 milliGRAM(s) IV Push daily  dextrose 40% Gel 15 Gram(s) Oral once  dextrose 5%. 1000 milliLiter(s) (50 mL/Hr) IV Continuous <Continuous>  dextrose 5%. 1000 milliLiter(s) (100 mL/Hr) IV Continuous <Continuous>  dextrose 50% Injectable 25 Gram(s) IV Push once  dextrose 50% Injectable 12.5 Gram(s) IV Push once  dextrose 50% Injectable 25 Gram(s) IV Push once  enoxaparin Injectable 40 milliGRAM(s) SubCutaneous two times a day  glucagon  Injectable 1 milliGRAM(s) IntraMuscular once  influenza  Vaccine (HIGH DOSE) 0.7 milliLiter(s) IntraMuscular once  insulin lispro (ADMELOG) corrective regimen sliding scale   SubCutaneous three times a day before meals  insulin lispro (ADMELOG) corrective regimen sliding scale   SubCutaneous at bedtime  lisinopril 40 milliGRAM(s) Oral daily  remdesivir  IVPB   IV Intermittent   remdesivir  IVPB 200 milliGRAM(s) IV Intermittent every 24 hours    MEDICATIONS  (PRN):  acetaminophen     Tablet .. 650 milliGRAM(s) Oral every 6 hours PRN Temp greater or equal to 38C (100.4F), Mild Pain (1 - 3)  melatonin 3 milliGRAM(s) Oral at bedtime PRN Insomnia      CAPILLARY BLOOD GLUCOSE      POCT Blood Glucose.: 493 mg/dL (16 Jan 2022 12:19)  POCT Blood Glucose.: 451 mg/dL (16 Jan 2022 08:47)  POCT Blood Glucose.: 431 mg/dL (16 Jan 2022 08:45)  POCT Blood Glucose.: 271 mg/dL (15 Tom 2022 23:09)  POCT Blood Glucose.: 329 mg/dL (15 Tom 2022 20:24)  POCT Blood Glucose.: 417 mg/dL (15 Tom 2022 17:28)    I&O's Summary      PHYSICAL EXAM:  Vital Signs Last 24 Hrs  T(C): 36.1 (16 Jan 2022 05:30), Max: 38.6 (15 Tom 2022 14:39)  T(F): 97 (16 Jan 2022 05:30), Max: 101.5 (15 Tom 2022 14:39)  HR: 74 (16 Jan 2022 05:30) (74 - 110)  BP: 170/70 (16 Jan 2022 05:30) (164/82 - 198/87)  BP(mean): --  RR: 18 (16 Jan 2022 05:30) (16 - 20)  SpO2: 94% (16 Jan 2022 05:30) (94% - 100%)    PHYSICAL EXAM:     GENERAL: NAD, obese, lying in bed comfortably  HEAD:  Atraumatic, Normocephalic  EYES: EOMI, PERRLA, conjunctiva and sclera clear  ENT: Moist mucous membranes  NECK: Supple, No JVD  CHEST/LUNG: Clear to auscultation bilaterally; No respiratory distress.  HEART: Regular rate and rhythm; No murmurs, rubs, or gallops  ABDOMEN: normal bowel sounds; Soft, nontender, nondistended  EXTREMITIES:  2+ Peripheral Pulses, brisk capillary refill. No clubbing, cyanosis, or edema. No wounds on feet.  Neurological:  A&Ox3, no focal deficits   SKIN: Dressing at R buttock where abscess was drained, no bleeding, pus noted around dressing. Non-tender to palpation.  PSYCH: normal affect and mood  LABS:                        12.4   7.80  )-----------( 392      ( 15 Tom 2022 15:59 )             37.6     01-16    132<L>  |  95<L>  |  16  ----------------------------<  424<H>  4.7   |  17<L>  |  0.85    Ca    9.0      16 Jan 2022 07:05  Phos  4.3     01-16  Mg     2.00     01-16    TPro  7.1  /  Alb  2.9<L>  /  TBili  0.5  /  DBili  <0.2  /  AST  19  /  ALT  13  /  AlkPhos  97  01-16    PT/INR - ( 15 Tom 2022 15:59 )   PT: 13.7 sec;   INR: 1.21 ratio         PTT - ( 15 Tom 2022 15:59 )  PTT:30.2 sec            RADIOLOGY & ADDITIONAL TESTS:  Results Reviewed:   Imaging Personally Reviewed:  Electrocardiogram Personally Reviewed:    COORDINATION OF CARE:  Care Discussed with Consultants/Other Providers [Y/N]:  Prior or Outpatient Records Reviewed [Y/N]:  
      History: denies n/v, good appetite, denies s/s of hypoglycemia.  Has not learned how to use insulin pens yet.  Likely dc tomorrow.    MEDICATIONS  (STANDING):  amLODIPine   Tablet 10 milliGRAM(s) Oral daily  atorvastatin 10 milliGRAM(s) Oral at bedtime  clindamycin IVPB 600 milliGRAM(s) IV Intermittent every 8 hours  dexAMETHasone  Injectable 6 milliGRAM(s) IV Push daily  dextrose 40% Gel 15 Gram(s) Oral once  dextrose 5%. 1000 milliLiter(s) (50 mL/Hr) IV Continuous <Continuous>  dextrose 5%. 1000 milliLiter(s) (100 mL/Hr) IV Continuous <Continuous>  dextrose 50% Injectable 25 Gram(s) IV Push once  dextrose 50% Injectable 12.5 Gram(s) IV Push once  dextrose 50% Injectable 25 Gram(s) IV Push once  enoxaparin Injectable 90 milliGRAM(s) SubCutaneous every 12 hours  glucagon  Injectable 1 milliGRAM(s) IntraMuscular once  influenza  Vaccine (HIGH DOSE) 0.7 milliLiter(s) IntraMuscular once  insulin glargine Injectable (LANTUS) 22 Unit(s) SubCutaneous at bedtime  insulin lispro (ADMELOG) corrective regimen sliding scale   SubCutaneous three times a day before meals  insulin lispro (ADMELOG) corrective regimen sliding scale   SubCutaneous at bedtime  insulin lispro Injectable (ADMELOG) 13 Unit(s) SubCutaneous three times a day before meals  lisinopril 40 milliGRAM(s) Oral daily  remdesivir  IVPB   IV Intermittent   remdesivir  IVPB 100 milliGRAM(s) IV Intermittent every 24 hours    MEDICATIONS  (PRN):  acetaminophen     Tablet .. 650 milliGRAM(s) Oral every 6 hours PRN Temp greater or equal to 38C (100.4F), Mild Pain (1 - 3)  melatonin 3 milliGRAM(s) Oral at bedtime PRN Insomnia      Allergies    No Known Allergies    Intolerances      Review of Systems:  Constitutional: No fever  ALL OTHER SYSTEMS REVIEWED AND NEGATIVE        PHYSICAL EXAM:  VITALS: T(C): 36.7 (01-18-22 @ 11:30)  T(F): 98 (01-18-22 @ 11:30), Max: 98 (01-18-22 @ 11:30)  HR: 65 (01-18-22 @ 11:30) (65 - 70)  BP: 145/72 (01-18-22 @ 11:30) (140/67 - 151/76)  RR:  (17 - 18)  SpO2:  (96% - 98%)  Wt(kg): --  GENERAL: NAD, well-developed  EYES: No proptosis, no lid lag, anicteric  GI: Soft, nontender, non distended  SKIN: Dry, intact, No rashes or lesions  PSYCH: Alert and oriented x 3, normal affect, normal mood      CAPILLARY BLOOD GLUCOSE  POCT Blood Glucose.: 205 mg/dL (18 Jan 2022 17:24)  POCT Blood Glucose.: 314 mg/dL (18 Jan 2022 12:12)  POCT Blood Glucose.: 260 mg/dL (18 Jan 2022 08:33)  POCT Blood Glucose.: 175 mg/dL (17 Jan 2022 20:52)  POCT Blood Glucose.: 190 mg/dL (17 Jan 2022 17:33)      01-18    x   |  x   |  x   ----------------------------<  x   x    |  x   |  0.89    EGFR if : 77  EGFR if non : 67    Ca    9.0      01-16  Mg     2.00     01-16  Phos  4.3     01-16    TPro  6.9  /  Alb  2.5<L>  /  TBili  0.7  /  DBili  <0.2  /  AST  20  /  ALT  12  /  AlkPhos  79  01-18        A1C with Estimated Average Glucose (01.17.22 @ 07:27)    A1C with Estimated Average Glucose Result: 13.2 %    Estimated Average Glucose: 332                  
Arpan Ovalles, Internal Medicine  Pager, [phone], [phone]    PROGRESS NOTE:     Patient is a 66y old  Female who presents with a chief complaint of R buttock abscess, COVID+ (17 Jan 2022 10:26)      SUBJECTIVE / OVERNIGHT EVENTS: No acute overnight events. Pt denies fever, chills, nausea, vomiting, chest pain, shortness of breath, light headedness, or dizziness.       MEDICATIONS  (STANDING):  amLODIPine   Tablet 10 milliGRAM(s) Oral daily  atorvastatin 10 milliGRAM(s) Oral at bedtime  clindamycin IVPB 600 milliGRAM(s) IV Intermittent every 8 hours  dexAMETHasone  Injectable 6 milliGRAM(s) IV Push daily  dextrose 40% Gel 15 Gram(s) Oral once  dextrose 5%. 1000 milliLiter(s) (50 mL/Hr) IV Continuous <Continuous>  dextrose 5%. 1000 milliLiter(s) (100 mL/Hr) IV Continuous <Continuous>  dextrose 50% Injectable 25 Gram(s) IV Push once  dextrose 50% Injectable 12.5 Gram(s) IV Push once  dextrose 50% Injectable 25 Gram(s) IV Push once  enoxaparin Injectable 90 milliGRAM(s) SubCutaneous every 12 hours  glucagon  Injectable 1 milliGRAM(s) IntraMuscular once  influenza  Vaccine (HIGH DOSE) 0.7 milliLiter(s) IntraMuscular once  insulin glargine Injectable (LANTUS) 19 Unit(s) SubCutaneous at bedtime  insulin lispro (ADMELOG) corrective regimen sliding scale   SubCutaneous three times a day before meals  insulin lispro Injectable (ADMELOG) 11 Unit(s) SubCutaneous three times a day before meals  lisinopril 40 milliGRAM(s) Oral daily  remdesivir  IVPB   IV Intermittent   remdesivir  IVPB 100 milliGRAM(s) IV Intermittent every 24 hours    MEDICATIONS  (PRN):  acetaminophen     Tablet .. 650 milliGRAM(s) Oral every 6 hours PRN Temp greater or equal to 38C (100.4F), Mild Pain (1 - 3)  melatonin 3 milliGRAM(s) Oral at bedtime PRN Insomnia      CAPILLARY BLOOD GLUCOSE      POCT Blood Glucose.: 102 mg/dL (17 Jan 2022 08:41)  POCT Blood Glucose.: 232 mg/dL (16 Jan 2022 22:57)  POCT Blood Glucose.: 395 mg/dL (16 Jan 2022 18:06)  POCT Blood Glucose.: 537 mg/dL (16 Jan 2022 14:08)  POCT Blood Glucose.: 493 mg/dL (16 Jan 2022 12:19)    I&O's Summary    16 Jan 2022 07:01  -  17 Jan 2022 07:00  --------------------------------------------------------  IN: 900 mL / OUT: 1350 mL / NET: -450 mL        PHYSICAL EXAM:  Vital Signs Last 24 Hrs  T(C): 37.7 (17 Jan 2022 05:38), Max: 38 (16 Jan 2022 18:50)  T(F): 99.8 (17 Jan 2022 05:38), Max: 100.4 (16 Jan 2022 18:50)  HR: 96 (17 Jan 2022 05:38) (88 - 96)  BP: 159/76 (17 Jan 2022 05:38) (141/70 - 160/89)  BP(mean): --  RR: 18 (17 Jan 2022 05:38) (18 - 18)  SpO2: 95% (17 Jan 2022 05:38) (95% - 98%)    PHYSICAL EXAM:     GENERAL: NAD, obese, lying in bed comfortably  HEAD:  Atraumatic, Normocephalic  EYES: EOMI, PERRLA, conjunctiva and sclera clear  ENT: Moist mucous membranes  NECK: Supple, No JVD  CHEST/LUNG: Clear to auscultation bilaterally; No respiratory distress.  HEART: Regular rate and rhythm; No murmurs, rubs, or gallops  ABDOMEN: normal bowel sounds; Soft, nontender, nondistended  EXTREMITIES:  2+ Peripheral Pulses, brisk capillary refill. No clubbing, cyanosis, or edema. No wounds on feet.  Neurological:  A&Ox3, no focal deficits   SKIN: Dressing at R buttock where abscess was drained, no bleeding, pus noted around dressing. Non-tender to palpation.  PSYCH: normal affect and mood    LABS:                        12.4   7.80  )-----------( 392      ( 15 Tom 2022 15:59 )             37.6     01-16    132<L>  |  95<L>  |  16  ----------------------------<  424<H>  4.7   |  17<L>  |  0.85    Ca    9.0      16 Jan 2022 07:05  Phos  4.3     01-16  Mg     2.00     01-16    TPro  7.1  /  Alb  2.9<L>  /  TBili  0.5  /  DBili  <0.2  /  AST  19  /  ALT  13  /  AlkPhos  97  01-16    PT/INR - ( 15 Tom 2022 15:59 )   PT: 13.7 sec;   INR: 1.21 ratio         PTT - ( 15 Tom 2022 15:59 )  PTT:30.2 sec          Culture - Blood (collected 15 Tom 2022 21:05)  Source: .Blood Blood-Peripheral  Preliminary Report (16 Jan 2022 22:02):    No growth to date.    Culture - Blood (collected 15 Tom 2022 21:02)  Source: .Blood Blood-Peripheral  Preliminary Report (16 Jan 2022 22:02):    No growth to date.        RADIOLOGY & ADDITIONAL TESTS:  Results Reviewed:   Imaging Personally Reviewed:  Electrocardiogram Personally Reviewed:    COORDINATION OF CARE:  Care Discussed with Consultants/Other Providers [Y/N]:  Prior or Outpatient Records Reviewed [Y/N]:  
[facility] Division of Hospital Medicine  Neel Perez MD  In House Pager [phone]    Patient is a 66y old  Female who presents with a chief complaint of R buttock abscess, COVID+ (18 Jan 2022 09:43)      SUBJECTIVE / OVERNIGHT EVENTS:  No overnight events. Labs and vitals reviewed. no fever, no leukocytosis.   Patient seen and examined at bedside, no acute complaints. no pain from wound.   No fever, no chills, no SOB, no CP, no n/v/d, no abd pain, no dysuria      MEDICATIONS  (STANDING):  amLODIPine   Tablet 10 milliGRAM(s) Oral daily  atorvastatin 10 milliGRAM(s) Oral at bedtime  clindamycin IVPB 600 milliGRAM(s) IV Intermittent every 8 hours  dexAMETHasone  Injectable 6 milliGRAM(s) IV Push daily  dextrose 40% Gel 15 Gram(s) Oral once  dextrose 5%. 1000 milliLiter(s) (50 mL/Hr) IV Continuous <Continuous>  dextrose 5%. 1000 milliLiter(s) (100 mL/Hr) IV Continuous <Continuous>  dextrose 50% Injectable 25 Gram(s) IV Push once  dextrose 50% Injectable 12.5 Gram(s) IV Push once  dextrose 50% Injectable 25 Gram(s) IV Push once  enoxaparin Injectable 90 milliGRAM(s) SubCutaneous every 12 hours  glucagon  Injectable 1 milliGRAM(s) IntraMuscular once  influenza  Vaccine (HIGH DOSE) 0.7 milliLiter(s) IntraMuscular once  insulin glargine Injectable (LANTUS) 22 Unit(s) SubCutaneous at bedtime  insulin lispro (ADMELOG) corrective regimen sliding scale   SubCutaneous three times a day before meals  insulin lispro (ADMELOG) corrective regimen sliding scale   SubCutaneous at bedtime  insulin lispro Injectable (ADMELOG) 11 Unit(s) SubCutaneous three times a day before meals  lisinopril 40 milliGRAM(s) Oral daily  remdesivir  IVPB   IV Intermittent   remdesivir  IVPB 100 milliGRAM(s) IV Intermittent every 24 hours    MEDICATIONS  (PRN):  acetaminophen     Tablet .. 650 milliGRAM(s) Oral every 6 hours PRN Temp greater or equal to 38C (100.4F), Mild Pain (1 - 3)  melatonin 3 milliGRAM(s) Oral at bedtime PRN Insomnia    CAPILLARY BLOOD GLUCOSE      POCT Blood Glucose.: 314 mg/dL (18 Jan 2022 12:12)  POCT Blood Glucose.: 260 mg/dL (18 Jan 2022 08:33)  POCT Blood Glucose.: 175 mg/dL (17 Jan 2022 20:52)  POCT Blood Glucose.: 190 mg/dL (17 Jan 2022 17:33)    I&O's Summary    17 Jan 2022 07:01  -  18 Jan 2022 07:00  --------------------------------------------------------  IN: 400 mL / OUT: 0 mL / NET: 400 mL        PHYSICAL EXAM:  Vital Signs Last 24 Hrs  T(C): 36.7 (18 Jan 2022 11:30), Max: 36.7 (18 Jan 2022 11:30)  T(F): 98 (18 Jan 2022 11:30), Max: 98 (18 Jan 2022 11:30)  HR: 65 (18 Jan 2022 11:30) (65 - 70)  BP: 145/72 (18 Jan 2022 11:30) (140/67 - 151/76)  BP(mean): --  RR: 17 (18 Jan 2022 11:30) (17 - 18)  SpO2: 98% (18 Jan 2022 11:30) (96% - 98%)    Gen: NAD; sitting in bed comfortably   Pulm: no accessory muscle use; lungs clear on auscultation bilaterally; no wheezing or crackles.   Cards: RRR, nl S1/S2; no LE edema; no JVD  Abd: non-distended; soft and NT on exam; +bs  Ext: OSCAR; no joint effusion or tenderness in upper and lower extremities; no cyanosis  Neuro: Awake and Alert; non-focal; moving all extremities.   Skin: right buttocks area of hyperpigmentation. no local induration. no drainage from prior I&D sites.     LABS:    01-18    x   |  x   |  x   ----------------------------<  x   x    |  x   |  0.89      TPro  6.9  /  Alb  2.5<L>  /  TBili  0.7  /  DBili  <0.2  /  AST  20  /  ALT  12  /  AlkPhos  79  01-18    PT/INR - ( 18 Jan 2022 08:35 )   PT: 13.3 sec;   INR: 1.18 ratio                   Culture - Blood (collected 15 Tom 2022 21:05)  Source: .Blood Blood-Peripheral  Preliminary Report (16 Jan 2022 22:02):    No growth to date.    Culture - Blood (collected 15 Tom 2022 21:02)  Source: .Blood Blood-Peripheral  Preliminary Report (16 Jan 2022 22:02):    No growth to date.        RADIOLOGY & ADDITIONAL TESTS:  Results Reviewed: Y  Imaging Personally Reviewed: Y  Electrocardiogram Personally Reviewed: Y    COORDINATION OF CARE:  Care Discussed with Consultants/Other Providers [Y/N]: Y  Prior or Outpatient Records Reviewed [Y/N]: Y

## 2022-01-19 NOTE — PROGRESS NOTE ADULT - PROBLEM SELECTOR PLAN 4
-c/w lisinopril 40mg  -c/w home amlodipine 10mg

## 2022-01-19 NOTE — PROGRESS NOTE ADULT - PROBLEM SELECTOR PROBLEM 2
Abscess of buttock, right
Abscess of buttock, right
Steroid-induced hyperglycemia
Abscess of buttock, right
Abscess of buttock, right

## 2022-01-19 NOTE — DIETITIAN INITIAL EVALUATION ADULT. - PERTINENT LABORATORY DATA
01-19 Na n/a   Glu n/a   K+ n/a   Cr 1.17 mg/dL BUN n/a   Phos n/a    01-19 @ 09:17 POCT 152 mg/dL  01-18 @ 21:01 POCT 146 mg/dL  01-18 @ 17:24 POCT 205 mg/dL  01-18 @ 12:12 POCT 314 mg/dL  A1C with Estimated Average Glucose (01.17.22 @ 07:27), A1C with Estimated Average Glucose Result: 13.2

## 2022-01-19 NOTE — DIETITIAN INITIAL EVALUATION ADULT. - PROBLEM SELECTOR PLAN 1
O2 sat in Triage 89% on RA  s/p dexamethasone in ED, prelim CXR showing L patchy peripheral opacities, reportedly sat 89% on RA at urgent care  -wean NC as tolerated  -remdesivir 5 days (start 1/16)  - dexamethasone 6mg IV (9 days starting 1/16)

## 2022-01-19 NOTE — DIETITIAN INITIAL EVALUATION ADULT. - ADD RECOMMEND
Continue diet as ordered. Follow up on nutrition education as needed. Obtain weekly weight and document PO intake to monitor trend.

## 2022-01-19 NOTE — PROGRESS NOTE ADULT - REASON FOR ADMISSION
R buttock abscess, COVID+

## 2022-01-19 NOTE — PROGRESS NOTE ADULT - PROBLEM SELECTOR PROBLEM 1
Type 2 diabetes mellitus
Uncontrolled type 2 diabetes mellitus with hyperglycemia
Type 2 diabetes mellitus
COVID-19
Type 2 diabetes mellitus

## 2022-01-19 NOTE — DIETITIAN INITIAL EVALUATION ADULT. - ORAL INTAKE PTA/DIET HISTORY
Patient reports good appetite/PO intake PTA. Reports not following a diabetes therapeutic diet at home. Per diet recall, patient has 3 meals/day, tries to control portions but stated she does have times where she'll "cheat" and consume extra sources of carbohydrates.

## 2022-01-19 NOTE — PROGRESS NOTE ADULT - PROBLEM SELECTOR PLAN 3
-Currently 95% on RA, breathing comfortably.   -O2 sat in ED Triage 89% on RA  -Titrate O2 to keep spo2>92%  -c/w remdesivir 5 days (start 1/16)  - c/w dexamethasone 6mg IV for 10 day course  -Pt with BMI> 30 with Dimer >2xULN will give therapeutic lovenox per protocol  - clinically improved quickly now on RA. can stop Remdesivir and Dexametahsone on discharge.   - d/c home on Xarelto 10mg qd for DVT ppx for 30 days.

## 2022-01-19 NOTE — DIETITIAN INITIAL EVALUATION ADULT. - PROBLEM SELECTOR PLAN 3
fingersticks 300-400ss at admission, no AG, no acidosis  -q6h finger sticks  -moderate sliding scale before meals and at night  -carb consistent diet

## 2022-01-19 NOTE — PROGRESS NOTE ADULT - ASSESSMENT
66F with mhx of DM2, HTN, HLD here with abscess on R buttock for 1 week with fever, also found to be COVID+ (unvaccinated). Patient was briefly hypoxic, now on RA w/o further symptoms. abscess was drained in ED, and healing well at thist julia.

## 2022-01-19 NOTE — PROGRESS NOTE ADULT - PROBLEM SELECTOR PLAN 1
fingersticks >500 yesterday, improved this morning.   A1c=13.2, endocrine consulted  - diabetes teaching, insulin teaching.   - send supplies for patient's pharmacy.   - patient educated on medication compliance. she will follow up with own endocrinologist.   - patient will NOT require steroids on discharge.

## 2022-01-19 NOTE — PROGRESS NOTE ADULT - PROBLEM SELECTOR PLAN 2
I&D in ED with resolution of pain, febrile to 101.5 on admission  -continue clindamycin 7 days total (1/15  - no abscess cultures.   -f/u blood cultures, NTD  improving clinically on Clindamycin, will transition to PO on discharge.

## 2022-01-19 NOTE — DIETITIAN INITIAL EVALUATION ADULT. - OTHER INFO
66 year old female with a PMH of DM2, HTN, HLD here with abscess on R buttock for 1 week with fever, also found to be COVID+ per chart.    Patient reports good appetite in house. Reports no GI distress at this time or chewing/swallowing difficulties. Has no food allergies. Reports UBW is 208 lbs. PTA, ABW is 209.2 lbs. (1/15) per chart indicating stable weights. No edema or pressure injuries noted per RN flow sheet. POC blood glucose and A1c noted per chart.    Provided pt with handout Carbohydrate Counting for People with Diabetes. Discussed carbohydrate sources, carbohydrate portions, protein sources, mixed meals, and nutrition label reading. Stressed importance of balanced meals with adequate protein and fiber. Pt was informed of current A1c, goal A1c, and goal fingerstick range.

## 2022-01-19 NOTE — DIETITIAN INITIAL EVALUATION ADULT. - PERTINENT MEDS FT
MEDICATIONS  (STANDING):  amLODIPine   Tablet 10 milliGRAM(s) Oral daily  atorvastatin 10 milliGRAM(s) Oral at bedtime  clindamycin IVPB 600 milliGRAM(s) IV Intermittent every 8 hours  dexAMETHasone  Injectable 6 milliGRAM(s) IV Push daily  dextrose 40% Gel 15 Gram(s) Oral once  dextrose 5%. 1000 milliLiter(s) (50 mL/Hr) IV Continuous <Continuous>  dextrose 5%. 1000 milliLiter(s) (100 mL/Hr) IV Continuous <Continuous>  dextrose 50% Injectable 25 Gram(s) IV Push once  dextrose 50% Injectable 12.5 Gram(s) IV Push once  dextrose 50% Injectable 25 Gram(s) IV Push once  enoxaparin Injectable 90 milliGRAM(s) SubCutaneous every 12 hours  glucagon  Injectable 1 milliGRAM(s) IntraMuscular once  influenza  Vaccine (HIGH DOSE) 0.7 milliLiter(s) IntraMuscular once  insulin glargine Injectable (LANTUS) 22 Unit(s) SubCutaneous at bedtime  insulin lispro (ADMELOG) corrective regimen sliding scale   SubCutaneous three times a day before meals  insulin lispro (ADMELOG) corrective regimen sliding scale   SubCutaneous at bedtime  insulin lispro Injectable (ADMELOG) 13 Unit(s) SubCutaneous three times a day before meals  lisinopril 40 milliGRAM(s) Oral daily  remdesivir  IVPB   IV Intermittent   remdesivir  IVPB 100 milliGRAM(s) IV Intermittent every 24 hours

## 2022-02-03 ENCOUNTER — APPOINTMENT (OUTPATIENT)
Dept: CARE COORDINATION | Facility: HOME HEALTH | Age: 67
End: 2022-02-03
Payer: MEDICARE

## 2022-02-03 DIAGNOSIS — E11.8 TYPE 2 DIABETES MELLITUS WITH UNSPECIFIED COMPLICATIONS: ICD-10-CM

## 2022-02-03 DIAGNOSIS — Z79.4 TYPE 2 DIABETES MELLITUS WITH UNSPECIFIED COMPLICATIONS: ICD-10-CM

## 2022-02-03 DIAGNOSIS — I11.9 HYPERTENSIVE HEART DISEASE W/OUT HEART FAILURE: ICD-10-CM

## 2022-02-03 DIAGNOSIS — I10 ESSENTIAL (PRIMARY) HYPERTENSION: ICD-10-CM

## 2022-02-03 DIAGNOSIS — E66.01 MORBID (SEVERE) OBESITY DUE TO EXCESS CALORIES: ICD-10-CM

## 2022-02-03 DIAGNOSIS — E11.9 TYPE 2 DIABETES MELLITUS W/OUT COMPLICATIONS: ICD-10-CM

## 2022-02-03 DIAGNOSIS — Z71.89 OTHER SPECIFIED COUNSELING: ICD-10-CM

## 2022-02-03 PROCEDURE — 99350 HOME/RES VST EST HIGH MDM 60: CPT

## 2022-02-03 RX ORDER — LISINOPRIL 20 MG/1
20 TABLET ORAL TWICE DAILY
Qty: 1 | Refills: 0 | Status: ACTIVE | COMMUNITY
Start: 2022-02-03 | End: 1900-01-01

## 2022-02-03 RX ORDER — 70%ISOPROPYL ALCOHOL 0.7 ML/ML
70 SWAB TOPICAL
Qty: 1 | Refills: 0 | Status: ACTIVE | COMMUNITY
Start: 2022-02-03 | End: 1900-01-01

## 2022-02-03 RX ORDER — PEN NEEDLE, DIABETIC 32GX 5/32"
32G X 4 MM NEEDLE, DISPOSABLE MISCELLANEOUS
Qty: 2 | Refills: 0 | Status: ACTIVE | COMMUNITY
Start: 2022-02-03 | End: 1900-01-01

## 2022-02-04 VITALS
OXYGEN SATURATION: 98 % | HEART RATE: 86 BPM | RESPIRATION RATE: 16 BRPM | SYSTOLIC BLOOD PRESSURE: 160 MMHG | DIASTOLIC BLOOD PRESSURE: 80 MMHG | TEMPERATURE: 97.5 F

## 2022-02-04 PROBLEM — I11.9 HCVD (HYPERTENSIVE CARDIOVASCULAR DISEASE): Status: ACTIVE | Noted: 2020-09-29

## 2022-02-04 PROBLEM — E11.8 TYPE 2 DIABETES MELLITUS WITH COMPLICATION, WITH LONG-TERM CURRENT USE OF INSULIN: Status: ACTIVE | Noted: 2022-02-03

## 2022-02-04 PROBLEM — E66.01 OBESITY, CLASS III, BMI 40-49.9 (MORBID OBESITY): Status: ACTIVE | Noted: 2020-09-29

## 2022-02-04 PROBLEM — Z71.89 ADVANCED DIRECTIVES, COUNSELING/DISCUSSION: Status: ACTIVE | Noted: 2022-02-04

## 2022-02-04 NOTE — REVIEW OF SYSTEMS
[Vision Problems] : vision problems [Unsteady Walking] : ataxia [Negative] : Heme/Lymph [Fever] : no fever [Chills] : no chills [Fatigue] : no fatigue [Pain] : no pain [Earache] : no earache [Hearing Loss] : no hearing loss [Nosebleed] : no nosebleeds [Chest Pain] : no chest pain [Palpitations] : no palpitations [Lower Ext Edema] : no lower extremity edema [Shortness Of Breath] : no shortness of breath [Wheezing] : no wheezing [Cough] : no cough [Abdominal Pain] : no abdominal pain [Nausea] : no nausea [Constipation] : no constipation [Diarrhea] : diarrhea [Vomiting] : no vomiting [Incontinence] : no incontinence [Itching] : no itching [Headache] : no headache [Dizziness] : no dizziness [Suicidal] : not suicidal [Insomnia] : no insomnia [Anxiety] : no anxiety [Depression] : no depression [de-identified] : uses straight cane

## 2022-02-04 NOTE — COUNSELING
[Fall prevention counseling provided] : Fall prevention counseling provided [Good understanding] : Patient has a good understanding of disease, goals and obesity follow-up plan [Patient Non-adherent to care plan] : Patient non-adherent to care plan [Needs reinforcement, provided] : Patient needs reinforcement on understanding of lifestyle changes and steps needed to achieve self management goal; reinforcement was provided [Potential consequences of obesity discussed] : Potential consequences of obesity discussed [Benefits of weight loss discussed] : Benefits of weight loss discussed [Structured Weight Management Program suggested:] : Structured weight management program suggested [Encouraged to maintain food diary] : Encouraged to maintain food diary [Encouraged to increase physical activity] : Encouraged to increase physical activity [Encouraged to use exercise tracking device] : Encouraged to use exercise tracking device [FreeTextEntry2] : 1. Walk in her neighborhood, reports she does not know how to use tracking device to monitor her progress.\par 2. to eat more home cooked meals.

## 2022-02-04 NOTE — PHYSICAL EXAM
[No Acute Distress] : no acute distress [Well Nourished] : well nourished [EOMI] : extraocular movements intact [Normal Outer Ear/Nose] : the outer ears and nose were normal in appearance [Normal Oropharynx] : the oropharynx was normal [No JVD] : no jugular venous distention [Supple] : supple [No Respiratory Distress] : no respiratory distress  [No Accessory Muscle Use] : no accessory muscle use [Clear to Auscultation] : lungs were clear to auscultation bilaterally [Normal S1, S2] : normal S1 and S2 [No Edema] : there was no peripheral edema [Soft] : abdomen soft [Normal Bowel Sounds] : normal bowel sounds [No CVA Tenderness] : no CVA  tenderness [No Spinal Tenderness] : no spinal tenderness [No Rash] : no rash [No Skin Lesions] : no skin lesions [Speech Grossly Normal] : speech grossly normal [Memory Grossly Normal] : memory grossly normal [Normal Affect] : the affect was normal [Alert and Oriented x3] : oriented to person, place, and time [Normal Mood] : the mood was normal [Normal Insight/Judgement] : insight and judgment were intact [Comprehensive Foot Exam Normal] : Right and left foot were examined and both feet are normal. No ulcers in either foot. Toes are normal and with full ROM.  Normal tactile sensation with monofilament testing throughout both feet

## 2022-02-04 NOTE — CURRENT MEDS
[Lack of understanding] : lack of understanding [Yes] : Reviewed medication list for presence of high-risk medications. [Other____] : [unfilled] [Takes medication as prescribed] : does not take [FreeTextEntry1] : Pt reports she is now more willing to take her medications including insulin.

## 2022-02-04 NOTE — HISTORY OF PRESENT ILLNESS
[FreeTextEntry1] : F/U post hospital d/c [facility] pt. Uncontrolled DM. [de-identified] : Kaykay Almeida is a 66F with mhx of DM2, HTN, HLD here with abscess on R buttock for 1 week with fever, also found to be COVID+ (unvaccinated).fingersticks >500 yesterday, improved this morning. A1c=13.2.Being seen after discharge home from [facility]. She was admitted on 1/15/2022 for Rt buttock abscess/COVID +  and discharged home on 1/18/2022. Discharge medications from the discharge summary were reviewed and reconciled with the current medication list. Patient/caregiver reports that patient is adjusting well  to being home. Caregiver support is as follows.\par HOME VISIT\par Met pt sitting in her a sofa, she is alert and oriented x 3, uses a straight cane when ambulating. She denies any s/s hypoglycemia, CP, SOB, S/S COVID-19. Pt also had an abscess to the rt buttock, which was lanced and drained during hospitalization.  Area is now clean, dry and intact, no longer needing wound care. Pt reports blood glucose in range with pm readings greater than 200. \par pt denies s/s hypo/hyperglycemia, recent A1c 13.1, started on Lantus 15 u/Metformin 500 BID, -Instructed her to stop the Glimepiride to prevent hypoglycemia, extensive discussion of the diabetic diet, pt has written information, which was reviewed, Pt eating [employer] chicken or meatloaf with vegetables. Discuss sodium content of prepared foods and encouraged preparing more meals at home. Discussed the importance of blood glucose monitoring and taking medications as prescribed reinforced. To maintain blood glucose between 100 -180 mg/dl. Pt states she is now ready to be compliant. However, did not take her BP meds, when she takes, she is only taking the Amlodipine, /80, states it is sometimes higher. She has stopped taking the Losartan states "I ge diarrhea and I don’t feel good when I take it, I want to go back to the Lisinopril, it was not that bad." Sent to pharmacy, with pentips for insulin administration and alcohol pads. Pt will have PCP appt 3/3/22. Instructed her to call her ENDO and schedule an appointment. Instructed pt on the importance of exercise in managing diabetes and how she can move around safely in her home when the weather prevents her from walking outdoors.

## 2022-02-28 ENCOUNTER — APPOINTMENT (OUTPATIENT)
Dept: ENDOCRINOLOGY | Facility: CLINIC | Age: 67
End: 2022-02-28

## 2022-03-01 ENCOUNTER — APPOINTMENT (OUTPATIENT)
Dept: INTERNAL MEDICINE | Facility: CLINIC | Age: 67
End: 2022-03-01

## 2022-04-13 ENCOUNTER — RX RENEWAL (OUTPATIENT)
Age: 67
End: 2022-04-13

## 2022-08-08 ENCOUNTER — APPOINTMENT (OUTPATIENT)
Dept: ENDOCRINOLOGY | Facility: CLINIC | Age: 67
End: 2022-08-08

## 2022-12-19 NOTE — DIETITIAN INITIAL EVALUATION ADULT. - NS AS NUTRI INTERV ED CONTENT
Purpose of the nutrition education/Provided diabetic diet instruction & type 2 nutrition therapy handout material & weight loss tips handout material/Recommended modifications Spironolactone Counseling: Patient advised regarding risks of diarrhea, abdominal pain, hyperkalemia, birth defects (for female patients), liver toxicity and renal toxicity. The patient may need blood work to monitor liver and kidney function and potassium levels while on therapy. The patient verbalized understanding of the proper use and possible adverse effects of spironolactone.  All of the patient's questions and concerns were addressed.

## 2023-03-14 NOTE — DISCHARGE NOTE NURSING/CASE MANAGEMENT/SOCIAL WORK - NSDCPEXARELTOCOMP_GEN_ALL_CORE
Home
Rivaroxaban/Xarelto is used to treat and prevent blood clots.  If you are not able to swallow the tablets whole, you may crush the tablets and mix them with a small amount of applesauce and promptly take within four hours. Eat some food right after you swallow the mixture. Take 2.5mg or 10mg tablets of Rivaroxaban/Xarelto with or without food. Take 15mg or 20mg tablets of Rivaroxaban/Xarelto with food. Never skip a dose of Rivaroxaban/Xarelto.  If you take Rivaroxaban/Xarelto once a day and you forget to take your dose, take a dose as soon as you remember on the same day. If you take Rivaroxaban/Xarelto 2.5 mg twice a day and you forget to take your dose, skip the missed dose and take your next dose at your usual time. DO NOT take an extra pill to ‘catch up’. If you take Rivaroxaban/Xarelto 15 mg twice a day and you forget to take your dose, take a dose as soon as you remember on the same day. You may take 2 doses at the same time to make up for missed dose ONLY if you take a total of 30mg per day. Notify your doctor that you missed a dose. Take Rivaroxaban/Xarelto at the same time each morning and evening. Rivaroxaban/Xarelto may be taken with other medication or food.

## 2023-12-05 ENCOUNTER — OFFICE (OUTPATIENT)
Facility: LOCATION | Age: 68
Setting detail: OPHTHALMOLOGY
End: 2023-12-05
Payer: MEDICARE

## 2023-12-05 DIAGNOSIS — H25.13: ICD-10-CM

## 2023-12-05 DIAGNOSIS — D31.02: ICD-10-CM

## 2023-12-05 DIAGNOSIS — E11.3293: ICD-10-CM

## 2023-12-05 DIAGNOSIS — H35.372: ICD-10-CM

## 2023-12-05 DIAGNOSIS — H16.223: ICD-10-CM

## 2023-12-05 DIAGNOSIS — E11.9: ICD-10-CM

## 2023-12-05 DIAGNOSIS — H52.4: ICD-10-CM

## 2023-12-05 DIAGNOSIS — D31.01: ICD-10-CM

## 2023-12-05 PROCEDURE — 92015 DETERMINE REFRACTIVE STATE: CPT | Performed by: OPHTHALMOLOGY

## 2023-12-05 PROCEDURE — 92004 COMPRE OPH EXAM NEW PT 1/>: CPT | Performed by: OPHTHALMOLOGY

## 2023-12-05 PROCEDURE — 92134 CPTRZ OPH DX IMG PST SGM RTA: CPT | Performed by: OPHTHALMOLOGY

## 2023-12-05 ASSESSMENT — REFRACTION_MANIFEST
OD_AXIS: 110
OD_SPHERE: +1.25
OS_AXIS: 080
OS_VA1: 20/25
OD_CYLINDER: -1.00
OD_VA1: 20/25
OS_CYLINDER: -1.25
OS_ADD: +3.00
OS_SPHERE: +2.25
OD_ADD: +3.00

## 2023-12-05 ASSESSMENT — REFRACTION_AUTOREFRACTION
OD_CYLINDER: -1.00
OD_SPHERE: +1.50
OS_SPHERE: +2.50
OD_AXIS: 110
OS_CYLINDER: -1.25
OS_AXIS: 080

## 2023-12-05 ASSESSMENT — SPHEQUIV_DERIVED
OD_SPHEQUIV: 1
OD_SPHEQUIV: 0.75
OS_SPHEQUIV: 1.625
OS_SPHEQUIV: 1.875

## 2023-12-05 ASSESSMENT — CONFRONTATIONAL VISUAL FIELD TEST (CVF)
OD_FINDINGS: FULL
OS_FINDINGS: FULL

## 2023-12-05 ASSESSMENT — SUPERFICIAL PUNCTATE KERATITIS (SPK)
OD_SPK: 2+
OS_SPK: 2+

## 2024-02-09 NOTE — ED ADULT TRIAGE NOTE - CCCP TRG CHIEF CMPLNT
Called and spoke to Skylar     Confirmed appointment for 2.15 - added appointment to the waitlist     Skylar said she might not be in town on 2/15, but was hoping for a sooner appointment     If Skylar is not in town by 2/15 she will reschedule for April when she is on spring break for college     Judy Washington Communication Facilitator on 2/9/2024 at 3:02 PM     swelling of legs

## 2024-04-26 NOTE — ED ADULT NURSE NOTE - CHIEF COMPLAINT QUOTE
An arrival time for procedure was not provided during PAT visit. If patient had any questions or concerns about their arrival time, they were instructed to contact their surgeon/physician.  Additionally, if the patient referred to an arrival time that was acquired from their my chart account, patient was encouraged to verify that time with their surgeon/physician. Arrival times are NOT provided in Pre Admission Testing Department.     recurrent right lower leg cellulitis. Pt has hx of diabetes

## 2024-08-09 NOTE — DISCHARGE NOTE PROVIDER - NSDCQMERRANDS_GEN_ALL_CORE
Assessment     ASSESSMENT/PLAN:      Patient Instructions   Neck mass: get ultrasound   Abnormal pap: needs repeat testing this year, referred to gynecology   Preventative: get preventative labs   Excess skin: pt lost >100lbs with diet and exercise, recommend plastic surgery for further evaluation       Signed by: Zoie Barboza DO       FUTURE DIRECTION:   []    Subjective   SUBJECTIVE:     HPI : Patient is a 40 y.o. female who presents today for the following:     Goiter   States that it has probably been there for years   She noticed this there was enlarged   Does mention low iron,     Currently menstruating, typically regular and not heavy     Review of Systems    History reviewed. No pertinent past medical history.     History reviewed. No pertinent surgical history.     Current Outpatient Medications   Medication Instructions    cholecalciferol, vitamin D3, (VITAMIN D3 ORAL) oral    multivitamin tablet 1 tablet, oral, Daily    psyllium seed, with sugar, (FIBER ORAL) oral        Allergies   Allergen Reactions    Penicillins Hives and Swelling     Eye swelling        Social History     Socioeconomic History    Marital status: Single     Spouse name: Not on file    Number of children: Not on file    Years of education: Not on file    Highest education level: Not on file   Occupational History    Occupation: Human resources   Tobacco Use    Smoking status: Never    Smokeless tobacco: Never   Vaping Use    Vaping status: Never Used   Substance and Sexual Activity    Alcohol use: Yes     Alcohol/week: 1.0 standard drink of alcohol     Types: 1 Standard drinks or equivalent per week    Drug use: Never    Sexual activity: Yes     Birth control/protection: I.U.D.   Other Topics Concern    Not on file   Social History Narrative    Not on file     Social Determinants of Health     Financial Resource Strain: Not on file   Food Insecurity: Not on file   Transportation Needs: Not on file   Physical Activity: Not on  "file   Stress: Not on file   Social Connections: Not on file   Intimate Partner Violence: Not on file   Housing Stability: Not on file        Family History   Problem Relation Name Age of Onset    Hypertension Mother      Hypertension Mother's Sister      Breast cancer Mother's Sister      Malig Hypertension Mother's Brother          Objective     OBJECTIVE:     Vitals:    08/09/24 1641   BP: 118/80   Pulse: 73   Temp: 36.5 °C (97.7 °F)   SpO2: 100%   Weight: 111 kg (245 lb)   Height: 1.689 m (5' 6.5\")        Physical Exam  Constitutional:       Appearance: She is obese.   HENT:      Head: Normocephalic and atraumatic.      Nose: Nose normal.      Mouth/Throat:      Mouth: Mucous membranes are moist.   Eyes:      Pupils: Pupils are equal, round, and reactive to light.   Neck:      Thyroid: Thyromegaly present.   Cardiovascular:      Rate and Rhythm: Normal rate and regular rhythm.      Pulses: Normal pulses.      Heart sounds: No murmur heard.  Pulmonary:      Effort: Pulmonary effort is normal.      Breath sounds: Normal breath sounds.   Abdominal:      Tenderness: There is no abdominal tenderness.   Musculoskeletal:         General: Normal range of motion.      Cervical back: Normal range of motion.   Skin:     General: Skin is warm and dry.   Neurological:      Mental Status: She is alert.   Psychiatric:         Mood and Affect: Mood normal.             " No

## 2024-09-13 NOTE — PATIENT PROFILE ADULT. - NS PRO TALK SOMEONE YN
Port accessed and flushed with good blood return noted.Labs drawn from port Port flushed with saline and heparin prior to needle removal.     
no

## 2025-05-19 NOTE — DISCHARGE NOTE PROVIDER - NSDCCPCAREPLAN_GEN_ALL_CORE_FT
PRINCIPAL DISCHARGE DIAGNOSIS  Diagnosis: Gluteal abscess  Assessment and Plan of Treatment: You had an incision and drainage of the abcess, YOu were Treated with IV antibiotics, Pleae Complete the Course   Clean with NS or soap and water at home. Pat dry. Place small Silicone foam with borders. Change every other day. Recommend  to use soft cotton underwear at home.   Follow up with Your PMD in 1 week      SECONDARY DISCHARGE DIAGNOSES  Diagnosis: COVID-19  Assessment and Plan of Treatment: You have been diagnosed with the COVID-19 virus during your hospital stay. You must self quarantine to complete a 10 day time period.  Monitor for fevers, shortness of breath and cough primarily.  Monitor your temperature daily to not any changes and increases.   you preston Need to be on a blood thinner Called Xarelto x 30 days.   It has been determined that you no longer need hospitalization and can recover while remaining in self-quarantine at home. You should follow the prevention steps below until a healthcare provider or local or state health department says you can return to your normal activities.  1. You should restrict activities outside your home, except for getting medical care.  2. Do not go to work, school, or public areas.  3. Avoid using public transportation, ride-sharing, or taxis.  4. Separate yourself from other people and animals in your home.  5. Call ahead before visiting your doctor.  6. Wear a facemask.  7. Cover your coughs and sneezes.  8. Clean your hands often.  9. Avoid sharing personal household items.  10. Clean all “high-touch” surfaces everyday.  11. Monitor your symptoms.  If you have a medical emergency and need to call 911, notify the dispatch personnel that you have COVID-19 If possible, put on a facemask before emergency medical services arrive.  12. Stopping home isolation.      Diagnosis: Type 2 diabetes mellitus  Assessment and Plan of Treatment: YOu were seen by the Endocrine Team, Your Meds were adjusted, You are discharged on Lantus 15 U at bedtime, Novolog 6 Units Three times a day , add Metformin 500 mg orally Twice a day ,   Please Check your CReatinine in a couple of weeks, If stable can discuss increasing dose to 1000 mg Twice a day .    Diagnosis: Hyperglycemia  Assessment and Plan of Treatment:     Diagnosis: Gluteal abscess  Assessment and Plan of Treatment:     
3-point gait